# Patient Record
Sex: MALE | Race: WHITE | URBAN - NONMETROPOLITAN AREA
[De-identification: names, ages, dates, MRNs, and addresses within clinical notes are randomized per-mention and may not be internally consistent; named-entity substitution may affect disease eponyms.]

---

## 2019-05-31 ENCOUNTER — OFFICE VISIT (OUTPATIENT)
Dept: FAMILY MEDICINE CLINIC | Age: 39
End: 2019-05-31
Payer: COMMERCIAL

## 2019-05-31 VITALS
RESPIRATION RATE: 16 BRPM | HEIGHT: 70 IN | WEIGHT: 223 LBS | DIASTOLIC BLOOD PRESSURE: 76 MMHG | HEART RATE: 82 BPM | OXYGEN SATURATION: 98 % | SYSTOLIC BLOOD PRESSURE: 122 MMHG | BODY MASS INDEX: 31.92 KG/M2

## 2019-05-31 DIAGNOSIS — N52.9 ERECTILE DYSFUNCTION, UNSPECIFIED ERECTILE DYSFUNCTION TYPE: Primary | ICD-10-CM

## 2019-05-31 PROCEDURE — 99203 OFFICE O/P NEW LOW 30 MIN: CPT | Performed by: NURSE PRACTITIONER

## 2019-05-31 RX ORDER — ATORVASTATIN CALCIUM 20 MG/1
20 TABLET, FILM COATED ORAL DAILY
COMMUNITY
End: 2019-05-31

## 2019-05-31 RX ORDER — ATORVASTATIN CALCIUM 40 MG/1
1 TABLET, FILM COATED ORAL DAILY
COMMUNITY
Start: 2019-05-04 | End: 2019-12-18 | Stop reason: SDUPTHER

## 2019-05-31 RX ORDER — SILDENAFIL 50 MG/1
50 TABLET, FILM COATED ORAL DAILY PRN
Qty: 30 TABLET | Refills: 2 | Status: SHIPPED | OUTPATIENT
Start: 2019-05-31 | End: 2019-06-30

## 2019-05-31 ASSESSMENT — PATIENT HEALTH QUESTIONNAIRE - PHQ9
SUM OF ALL RESPONSES TO PHQ QUESTIONS 1-9: 0
2. FEELING DOWN, DEPRESSED OR HOPELESS: 0
1. LITTLE INTEREST OR PLEASURE IN DOING THINGS: 0
SUM OF ALL RESPONSES TO PHQ9 QUESTIONS 1 & 2: 0
SUM OF ALL RESPONSES TO PHQ QUESTIONS 1-9: 0

## 2019-05-31 ASSESSMENT — ENCOUNTER SYMPTOMS
NAUSEA: 0
EYE REDNESS: 0
SHORTNESS OF BREATH: 0
TROUBLE SWALLOWING: 0
ABDOMINAL PAIN: 0
DIARRHEA: 0
SORE THROAT: 0
EYE PAIN: 0
VOMITING: 0
COLOR CHANGE: 0
BACK PAIN: 0
CHEST TIGHTNESS: 0
COUGH: 0

## 2019-05-31 NOTE — PROGRESS NOTES
SUBJECTIVE:    Patient ID: Misbah Arora is a 41 y.o. male. Chief Complaint   Patient presents with    Discuss Medications       HPI     Patient's medications, allergies, past medical, surgical, social and family histories were reviewed and updated as appropriate. Current Outpatient Medications on File Prior to Visit   Medication Sig Dispense Refill    sertraline (ZOLOFT) 50 MG tablet Take 50 mg by mouth daily      Empagliflozin-metFORMIN HCl (SYNJARDY) 12.5-1000 MG TABS Take 1 tablet by mouth 2 times daily      atorvastatin (LIPITOR) 40 MG tablet Take 1 tablet by mouth daily       No current facility-administered medications on file prior to visit. Review of Systems   Constitutional: Negative for activity change, appetite change, chills and fever. HENT: Negative for congestion, ear pain, nosebleeds, sore throat and trouble swallowing. Eyes: Negative for pain and redness. Respiratory: Negative for cough, chest tightness and shortness of breath. Cardiovascular: Negative for chest pain, palpitations and leg swelling. Gastrointestinal: Negative for abdominal pain, diarrhea, nausea and vomiting. Genitourinary: Negative for difficulty urinating, dysuria and flank pain. Musculoskeletal: Negative for arthralgias, back pain and gait problem. Skin: Negative for color change, pallor, rash and wound. Allergic/Immunologic: Negative for environmental allergies and food allergies. Neurological: Negative for dizziness, numbness and headaches. Hematological: Negative for adenopathy. Does not bruise/bleed easily. Psychiatric/Behavioral: Negative for agitation and sleep disturbance. The patient is not nervous/anxious. OBJECTIVE:  /76   Pulse 82   Resp 16   Ht 5' 10\" (1.778 m)   Wt 223 lb (101.2 kg)   SpO2 98% Comment: room air  BMI 32.00 kg/m²       Physical Exam   Constitutional: He is oriented to person, place, and time.  Vital signs are normal. He appears well-developed and well-nourished. He is active. Non-toxic appearance. He does not have a sickly appearance. He does not appear ill. No distress. HENT:   Head: Normocephalic and atraumatic. Right Ear: Hearing, tympanic membrane, external ear and ear canal normal.   Left Ear: Hearing, tympanic membrane, external ear and ear canal normal.   Nose: Nose normal. No mucosal edema, rhinorrhea or nose lacerations. Right sinus exhibits no maxillary sinus tenderness and no frontal sinus tenderness. Left sinus exhibits no maxillary sinus tenderness and no frontal sinus tenderness. Mouth/Throat: Uvula is midline, oropharynx is clear and moist and mucous membranes are normal. Normal dentition. No dental caries. No tonsillar exudate. Eyes: Pupils are equal, round, and reactive to light. Conjunctivae, EOM and lids are normal. Right eye exhibits no discharge. Left eye exhibits no discharge. No scleral icterus. Neck: Trachea normal, normal range of motion, full passive range of motion without pain and phonation normal. Neck supple. Normal carotid pulses, no hepatojugular reflux and no JVD present. No tracheal tenderness present. Carotid bruit is not present. No tracheal deviation present. No thyroid mass and no thyromegaly present. Cardiovascular: Normal rate, regular rhythm, S1 normal, S2 normal, normal heart sounds, intact distal pulses and normal pulses. Exam reveals no gallop, no distant heart sounds and no friction rub. No murmur heard. Pulmonary/Chest: Effort normal and breath sounds normal. No accessory muscle usage or stridor. No apnea, no tachypnea and no bradypnea. He has no decreased breath sounds. He has no wheezes. He has no rhonchi. He has no rales. He exhibits no tenderness. Abdominal: Soft. Normal appearance and bowel sounds are normal. He exhibits no distension and no mass. There is no hepatosplenomegaly, splenomegaly or hepatomegaly. There is no tenderness. There is no rebound and no guarding.  No hernia. Musculoskeletal: Normal range of motion. He exhibits no edema, tenderness or deformity. Lymphadenopathy:     He has no cervical adenopathy. He has no axillary adenopathy. Neurological: He is alert and oriented to person, place, and time. He has normal strength. He is not disoriented. He displays normal reflexes. No cranial nerve deficit or sensory deficit. He exhibits normal muscle tone. Coordination normal. GCS eye subscore is 4. GCS verbal subscore is 5. GCS motor subscore is 6. Skin: Skin is warm, dry and intact. Capillary refill takes less than 2 seconds. No bruising and no rash noted. He is not diaphoretic. No erythema. No pallor. Psychiatric: He has a normal mood and affect. His speech is normal and behavior is normal. Judgment and thought content normal. Cognition and memory are normal.   Nursing note and vitals reviewed. No results found for requested labs within last 30 days. No results found for: LABA1C, LABMICR, LDLCALC      No results found for: WBC, NEUTROABS, HGB, HCT, MCV, PLT    No results found for: TSH      ASSESSMENT/PLAN:     There are no diagnoses linked to this encounter. PATIENT COUNSELING     Counseling was provided today regarding the following topics: Healthy eating habits, Regular exercise, substance abuse and healthy sleep habits. Discussed use, benefit, and side effects of prescribed medications. Barriers to medication compliance addressed. All patient questions answered. Patient voiced understanding. Medications Discontinued During This Encounter   Medication Reason    atorvastatin (LIPITOR) 20 MG tablet LIST CLEANUP       No follow-ups on file. KATY Field - CNP     Education was provided for discussed topics. Call the office with worsening complaints or any side effects to any medications. If an emergency please call 911.     Brett Ellison, MSN, APRN, FNP-BC, NP-C

## 2019-06-04 ASSESSMENT — ENCOUNTER SYMPTOMS
VOMITING: 0
TROUBLE SWALLOWING: 0
NAUSEA: 0
SORE THROAT: 0
COLOR CHANGE: 0
DIARRHEA: 0
CHEST TIGHTNESS: 0
EYE PAIN: 0
ABDOMINAL PAIN: 0
SHORTNESS OF BREATH: 0
BACK PAIN: 0
COUGH: 0
EYE REDNESS: 0

## 2019-06-04 NOTE — PROGRESS NOTES
SUBJECTIVE:    Patient ID: aMrquis Baugh is a 41 y.o. male. Chief Complaint   Patient presents with   Abbi Mary is a 41 y.o. male here for evaluation and treatment of erectile dysfunction. Partial erections occur with intercourse. Libido is affected. Risk factors for ED include beta blocker use and diabetes mellitus. Patient denies history of cardiovascular disease: , cranial, spinal, or pelvic trauma, hypogonadism, neurologic disease, pelvic radiation and urologic disease. Patient's expectations of sexual function would like to perform as age appropriate. Patient describes relationship with partner as good, could be better. Previous treatment of ED includes Viagra. Erectile Dysfunction   This is a recurrent problem. The current episode started more than 1 year ago. The problem has been waxing and waning since onset. The nature of his difficulty is achieving erection and maintaining erection. Non-physiologic factors contributing to erectile dysfunction are anxiety. He reports his erection duration to be less than 1 minute. Irritative symptoms do not include frequency, nocturia or urgency. Obstructive symptoms do not include dribbling, incomplete emptying, an intermittent stream, a slower stream, straining or a weak stream. Pertinent negatives include no chills, dysuria, genital pain, hematuria, hesitancy or inability to urinate. The symptoms are aggravated by medications and stress. Past treatments include sildenafil. The treatment provided significant relief. He has been using treatment for 1 to 7 days. He has had no adverse reactions caused by medications. Risk factors include tobacco use, diabetes mellitus and hypertension.         Active Ambulatory Problems     Diagnosis Date Noted    No Active Ambulatory Problems     Resolved Ambulatory Problems     Diagnosis Date Noted    No Resolved Ambulatory Problems     Past Medical History:   Diagnosis Date    Depression     Diabetes mellitus ([facility] [address]       No Known Allergies   Past Surgical History:   Procedure Laterality Date    ELBOW SURGERY Left       Family History   Problem Relation Age of Onset    Stroke Father     Cancer Maternal Grandfather     Cancer Paternal Grandfather      Patient's medications, allergies, past medical, surgical, social and family histories were reviewed and updated as appropriate. Current Outpatient Medications on File Prior to Visit   Medication Sig Dispense Refill    sertraline (ZOLOFT) 50 MG tablet Take 50 mg by mouth daily      Empagliflozin-metFORMIN HCl (SYNJARDY) 12.5-1000 MG TABS Take 1 tablet by mouth 2 times daily      atorvastatin (LIPITOR) 40 MG tablet Take 1 tablet by mouth daily       No current facility-administered medications on file prior to visit. Review of Systems   Constitutional: Negative for activity change, appetite change, chills and fever. HENT: Negative for congestion, ear pain, nosebleeds, sore throat and trouble swallowing. Eyes: Negative for pain and redness. Respiratory: Negative for cough, chest tightness and shortness of breath. Cardiovascular: Negative for chest pain, palpitations and leg swelling. Gastrointestinal: Negative for abdominal pain, diarrhea, nausea and vomiting. Genitourinary: Negative for difficulty urinating, dysuria, flank pain, frequency, hematuria, hesitancy, incomplete emptying, nocturia and urgency. Erectile disfunction. Musculoskeletal: Negative for arthralgias, back pain and gait problem. Skin: Negative for color change, pallor, rash and wound. Allergic/Immunologic: Negative for environmental allergies and food allergies. Neurological: Negative for dizziness, numbness and headaches. Hematological: Negative for adenopathy. Does not bruise/bleed easily. Psychiatric/Behavioral: Negative for agitation and sleep disturbance. The patient is nervous/anxious.       OBJECTIVE:  /76   Pulse 82   Resp 16   Ht 5' 10\" (1.778 m)   Wt 223 lb (101.2 kg)   SpO2 98% Comment: room air  BMI 32.00 kg/m²       Physical Exam   Constitutional: He is oriented to person, place, and time. Vital signs are normal. He appears well-developed and well-nourished. He is active. Non-toxic appearance. He does not have a sickly appearance. He does not appear ill. No distress. HENT:   Head: Normocephalic and atraumatic. Right Ear: Hearing, tympanic membrane, external ear and ear canal normal.   Left Ear: Hearing, tympanic membrane, external ear and ear canal normal.   Nose: Nose normal. No mucosal edema, rhinorrhea or nose lacerations. Right sinus exhibits no maxillary sinus tenderness and no frontal sinus tenderness. Left sinus exhibits no maxillary sinus tenderness and no frontal sinus tenderness. Mouth/Throat: Uvula is midline, oropharynx is clear and moist and mucous membranes are normal. Normal dentition. No dental caries. No oropharyngeal exudate. No tonsillar exudate. Eyes: Pupils are equal, round, and reactive to light. Conjunctivae, EOM and lids are normal. Right eye exhibits no discharge. Left eye exhibits no discharge. No scleral icterus. Neck: Trachea normal, normal range of motion, full passive range of motion without pain and phonation normal. Neck supple. Normal carotid pulses, no hepatojugular reflux and no JVD present. No tracheal tenderness present. Carotid bruit is not present. No tracheal deviation present. No thyroid mass and no thyromegaly present. Cardiovascular: Normal rate, regular rhythm, S1 normal, S2 normal, normal heart sounds, intact distal pulses and normal pulses. Exam reveals no gallop, no distant heart sounds and no friction rub. No murmur heard. Pulmonary/Chest: Effort normal and breath sounds normal. No accessory muscle usage or stridor. No apnea, no tachypnea and no bradypnea. No respiratory distress. He has no decreased breath sounds. He has no wheezes. He has no rhonchi. He has no rales.  He exhibits no tenderness. Abdominal: Soft. Normal appearance and bowel sounds are normal. He exhibits no distension and no mass. There is no hepatosplenomegaly, splenomegaly or hepatomegaly. There is no tenderness. There is no rebound and no guarding. No hernia. Musculoskeletal: Normal range of motion. He exhibits no edema, tenderness or deformity. Lymphadenopathy:     He has no cervical adenopathy. He has no axillary adenopathy. Neurological: He is alert and oriented to person, place, and time. He has normal strength. He is not disoriented. He displays normal reflexes. No cranial nerve deficit or sensory deficit. He exhibits normal muscle tone. Coordination normal. GCS eye subscore is 4. GCS verbal subscore is 5. GCS motor subscore is 6. Skin: Skin is warm, dry and intact. Capillary refill takes less than 2 seconds. No bruising and no rash noted. He is not diaphoretic. No erythema. No pallor. Psychiatric: He has a normal mood and affect. His speech is normal and behavior is normal. Judgment and thought content normal. Cognition and memory are normal.   Nursing note and vitals reviewed. No results found for requested labs within last 30 days. ASSESSMENT/PLAN:     Sarah Aponte was seen today for discuss medications. Diagnoses and all orders for this visit:    Erectile dysfunction, unspecified erectile dysfunction type  -     sildenafil (VIAGRA) 50 MG tablet; Take 1 tablet by mouth daily as needed for  Erectile Dysfunction  - Reviewed pathophysiology and differential diagnosis of erectile dysfunction with  the patient. Treatment options, including relative risks and benefits, were  discussed. All questions were answered. Discontinue potentially causitive medications. Trial of Viagra; no contraindications. - Discussed further work up with routine lab testing. Patient has another PCP and recently had labs drawn. I have reviewed these results today and there were no abnormal findings. - Discussed other options to replace the SSRI that patient is currently on to aid with sexual performance. Patient denies wanting to changes medications at this time. PATIENT COUNSELING     Counseling was provided today regarding the following topics: Healthy eating habits, Regular exercise, substance abuse and healthy sleep habits. Discussed use, benefit, and side effects of prescribed medications. Barriers to medication compliance addressed. All patient questions answered. Patient voiced understanding. Medications Discontinued During This Encounter   Medication Reason    atorvastatin (LIPITOR) 20 MG tablet LIST CLEANUP       Return if symptoms worsen or fail to improve. KATY Roth - CNP     Education was provided for discussed topics. Call the office with worsening complaints or any side effects to any medications. If an emergency please call 911.

## 2019-10-17 DIAGNOSIS — M54.50 ACUTE LEFT-SIDED LOW BACK PAIN WITHOUT SCIATICA: Primary | ICD-10-CM

## 2019-10-18 DIAGNOSIS — M54.50 ACUTE LEFT-SIDED LOW BACK PAIN WITHOUT SCIATICA: ICD-10-CM

## 2019-12-18 RX ORDER — ATORVASTATIN CALCIUM 40 MG/1
40 TABLET, FILM COATED ORAL DAILY
Qty: 30 TABLET | Refills: 3 | Status: SHIPPED | OUTPATIENT
Start: 2019-12-18 | End: 2020-06-08 | Stop reason: SDUPTHER

## 2019-12-18 RX ORDER — GLUCOSAMINE HCL/CHONDROITIN SU 500-400 MG
CAPSULE ORAL
Qty: 100 STRIP | Refills: 2 | Status: SHIPPED | OUTPATIENT
Start: 2019-12-18

## 2019-12-19 RX ORDER — LANCETS 30 GAUGE
1 EACH MISCELLANEOUS 2 TIMES DAILY
Qty: 100 EACH | Refills: 5 | Status: SHIPPED | OUTPATIENT
Start: 2019-12-19

## 2020-04-01 RX ORDER — EMPAGLIFLOZIN AND METFORMIN HYDROCHLORIDE 12.5; 1 MG/1; MG/1
TABLET ORAL
Qty: 60 TABLET | Refills: 0 | Status: SHIPPED | OUTPATIENT
Start: 2020-04-01 | End: 2020-05-18

## 2020-04-20 RX ORDER — ATORVASTATIN CALCIUM 40 MG/1
TABLET, FILM COATED ORAL
Qty: 30 TABLET | Refills: 2 | OUTPATIENT
Start: 2020-04-20

## 2020-05-07 RX ORDER — ATOMOXETINE 40 MG/1
40 CAPSULE ORAL DAILY
Qty: 30 CAPSULE | Refills: 3 | Status: SHIPPED | OUTPATIENT
Start: 2020-05-07 | End: 2020-06-08 | Stop reason: SDUPTHER

## 2020-05-18 RX ORDER — EMPAGLIFLOZIN AND METFORMIN HYDROCHLORIDE 12.5; 1 MG/1; MG/1
TABLET ORAL
Qty: 60 TABLET | Refills: 0 | Status: SHIPPED | OUTPATIENT
Start: 2020-05-18 | End: 2020-06-08 | Stop reason: SDUPTHER

## 2020-06-05 ENCOUNTER — HOSPITAL ENCOUNTER (OUTPATIENT)
Facility: HOSPITAL | Age: 40
Discharge: HOME | End: 2020-06-05
Payer: COMMERCIAL

## 2020-06-05 LAB
A/G RATIO: 1.9 (ref 0.8–2)
ALBUMIN SERPL-MCNC: 4.9 G/DL (ref 3.4–4.8)
ALP BLD-CCNC: 81 U/L (ref 25–100)
ALT SERPL-CCNC: 41 U/L (ref 4–36)
ANION GAP SERPL CALCULATED.3IONS-SCNC: 16 MMOL/L (ref 3–16)
AST SERPL-CCNC: 20 U/L (ref 8–33)
BASOPHILS ABSOLUTE: 0.1 K/UL (ref 0–0.1)
BASOPHILS RELATIVE PERCENT: 0.6 %
BILIRUB SERPL-MCNC: 0.5 MG/DL (ref 0.3–1.2)
BUN BLDV-MCNC: 17 MG/DL (ref 6–20)
CALCIUM SERPL-MCNC: 9.5 MG/DL (ref 8.5–10.5)
CHLORIDE BLD-SCNC: 100 MMOL/L (ref 98–107)
CHOLESTEROL, TOTAL: 161 MG/DL (ref 0–200)
CO2: 23 MMOL/L (ref 20–30)
CREAT SERPL-MCNC: 0.8 MG/DL (ref 0.4–1.2)
EOSINOPHILS ABSOLUTE: 0.2 K/UL (ref 0–0.4)
EOSINOPHILS RELATIVE PERCENT: 1.9 %
GFR AFRICAN AMERICAN: >59
GFR NON-AFRICAN AMERICAN: >59
GLOBULIN: 2.6 G/DL
GLUCOSE BLD-MCNC: 205 MG/DL (ref 74–106)
HBA1C MFR BLD: 8.5 %
HCT VFR BLD CALC: 48.5 % (ref 40–54)
HDLC SERPL-MCNC: 39 MG/DL (ref 40–60)
HEMOGLOBIN: 15.4 G/DL (ref 13–18)
IMMATURE GRANULOCYTES #: 0.1 K/UL
IMMATURE GRANULOCYTES %: 0.8 % (ref 0–5)
LDL CHOLESTEROL CALCULATED: 77 MG/DL
LYMPHOCYTES ABSOLUTE: 2.6 K/UL (ref 1.5–4)
LYMPHOCYTES RELATIVE PERCENT: 29.2 %
MCH RBC QN AUTO: 26.8 PG (ref 27–32)
MCHC RBC AUTO-ENTMCNC: 31.8 G/DL (ref 31–35)
MCV RBC AUTO: 84.3 FL (ref 80–100)
MONOCYTES ABSOLUTE: 0.7 K/UL (ref 0.2–0.8)
MONOCYTES RELATIVE PERCENT: 7.6 %
NEUTROPHILS ABSOLUTE: 5.2 K/UL (ref 2–7.5)
NEUTROPHILS RELATIVE PERCENT: 59.9 %
PDW BLD-RTO: 13.5 % (ref 11–16)
PLATELET # BLD: 276 K/UL (ref 150–400)
PMV BLD AUTO: 10.4 FL (ref 6–10)
POTASSIUM SERPL-SCNC: 4.7 MMOL/L (ref 3.4–5.1)
RBC # BLD: 5.75 M/UL (ref 4.5–6)
SODIUM BLD-SCNC: 139 MMOL/L (ref 136–145)
TOTAL PROTEIN: 7.5 G/DL (ref 6.4–8.3)
TRIGL SERPL-MCNC: 226 MG/DL (ref 0–249)
TSH REFLEX: 2.02 UIU/ML (ref 0.35–5.5)
VLDLC SERPL CALC-MCNC: 45 MG/DL
WBC # BLD: 8.7 K/UL (ref 4–11)

## 2020-06-05 PROCEDURE — 80061 LIPID PANEL: CPT

## 2020-06-05 PROCEDURE — 85025 COMPLETE CBC W/AUTO DIFF WBC: CPT

## 2020-06-05 PROCEDURE — 36415 COLL VENOUS BLD VENIPUNCTURE: CPT

## 2020-06-05 PROCEDURE — 84443 ASSAY THYROID STIM HORMONE: CPT

## 2020-06-05 PROCEDURE — 83036 HEMOGLOBIN GLYCOSYLATED A1C: CPT

## 2020-06-05 PROCEDURE — 80053 COMPREHEN METABOLIC PANEL: CPT

## 2020-06-08 ENCOUNTER — VIRTUAL VISIT (OUTPATIENT)
Dept: FAMILY MEDICINE CLINIC | Age: 40
End: 2020-06-08
Payer: COMMERCIAL

## 2020-06-08 PROCEDURE — 99442 PR PHYS/QHP TELEPHONE EVALUATION 11-20 MIN: CPT | Performed by: NURSE PRACTITIONER

## 2020-06-08 RX ORDER — ATOMOXETINE 40 MG/1
40 CAPSULE ORAL DAILY
Qty: 30 CAPSULE | Refills: 3 | Status: SHIPPED | OUTPATIENT
Start: 2020-06-08

## 2020-06-08 RX ORDER — EMPAGLIFLOZIN AND METFORMIN HYDROCHLORIDE 12.5; 1 MG/1; MG/1
TABLET ORAL
Qty: 60 TABLET | Refills: 2 | Status: SHIPPED | OUTPATIENT
Start: 2020-06-08

## 2020-06-08 RX ORDER — ATORVASTATIN CALCIUM 40 MG/1
40 TABLET, FILM COATED ORAL DAILY
Qty: 30 TABLET | Refills: 3 | Status: SHIPPED | OUTPATIENT
Start: 2020-06-08

## 2020-06-08 ASSESSMENT — ENCOUNTER SYMPTOMS
NAUSEA: 0
TROUBLE SWALLOWING: 0
DIARRHEA: 0
EYE REDNESS: 0
COLOR CHANGE: 0
EYE PAIN: 0
BACK PAIN: 0
CHEST TIGHTNESS: 0
ABDOMINAL PAIN: 0
SORE THROAT: 0
SHORTNESS OF BREATH: 0
COUGH: 0
VOMITING: 0

## 2020-06-15 RX ORDER — EMPAGLIFLOZIN AND METFORMIN HYDROCHLORIDE 12.5; 1 MG/1; MG/1
TABLET ORAL
Qty: 60 TABLET | Refills: 0 | OUTPATIENT
Start: 2020-06-15

## 2020-06-17 PROCEDURE — G0444 DEPRESSION SCREEN ANNUAL: HCPCS | Performed by: NURSE PRACTITIONER

## 2020-06-17 ASSESSMENT — PATIENT HEALTH QUESTIONNAIRE - PHQ9
SUM OF ALL RESPONSES TO PHQ9 QUESTIONS 1 & 2: 0
SUM OF ALL RESPONSES TO PHQ QUESTIONS 1-9: 0
2. FEELING DOWN, DEPRESSED OR HOPELESS: 0
1. LITTLE INTEREST OR PLEASURE IN DOING THINGS: 0
SUM OF ALL RESPONSES TO PHQ QUESTIONS 1-9: 0

## 2020-08-03 ENCOUNTER — TELEPHONE (OUTPATIENT)
Dept: FAMILY MEDICINE CLINIC | Age: 40
End: 2020-08-03

## 2020-08-03 RX ORDER — ATOMOXETINE 60 MG/1
60 CAPSULE ORAL DAILY
Qty: 30 CAPSULE | Refills: 3 | Status: SHIPPED | OUTPATIENT
Start: 2020-08-03

## 2020-08-20 RX ORDER — MELOXICAM 15 MG/1
15 TABLET ORAL DAILY
COMMUNITY
End: 2020-09-10 | Stop reason: SDUPTHER

## 2020-08-20 RX ORDER — ATOMOXETINE 60 MG/1
60 CAPSULE ORAL DAILY
COMMUNITY
End: 2020-09-10 | Stop reason: SDUPTHER

## 2020-08-20 RX ORDER — ATORVASTATIN CALCIUM 40 MG/1
40 TABLET, FILM COATED ORAL DAILY
COMMUNITY
End: 2020-09-10 | Stop reason: SDUPTHER

## 2020-08-20 RX ORDER — SILDENAFIL 50 MG/1
50 TABLET, FILM COATED ORAL AS NEEDED
COMMUNITY
End: 2021-02-26 | Stop reason: SDUPTHER

## 2020-08-24 PROBLEM — E78.5 HYPERLIPIDEMIA: Status: ACTIVE | Noted: 2020-08-24

## 2020-08-24 PROBLEM — R53.83 FATIGUE: Status: ACTIVE | Noted: 2020-08-24

## 2020-08-24 PROBLEM — E11.9 TYPE 2 DIABETES MELLITUS WITHOUT COMPLICATION (HCC): Status: ACTIVE | Noted: 2020-08-24

## 2020-09-10 ENCOUNTER — OFFICE VISIT (OUTPATIENT)
Dept: INTERNAL MEDICINE | Facility: CLINIC | Age: 40
End: 2020-09-10

## 2020-09-10 DIAGNOSIS — E11.9 TYPE 2 DIABETES MELLITUS WITHOUT COMPLICATION, WITHOUT LONG-TERM CURRENT USE OF INSULIN (HCC): Primary | ICD-10-CM

## 2020-09-10 DIAGNOSIS — K92.1 BLOODY STOOLS: ICD-10-CM

## 2020-09-10 DIAGNOSIS — M25.512 CHRONIC LEFT SHOULDER PAIN: ICD-10-CM

## 2020-09-10 DIAGNOSIS — F98.8 ATTENTION DEFICIT DISORDER (ADD) WITHOUT HYPERACTIVITY: ICD-10-CM

## 2020-09-10 DIAGNOSIS — Z13.0 SCREENING FOR BLOOD DISEASE: ICD-10-CM

## 2020-09-10 DIAGNOSIS — E78.5 HYPERLIPIDEMIA, UNSPECIFIED HYPERLIPIDEMIA TYPE: ICD-10-CM

## 2020-09-10 DIAGNOSIS — Z13.21 ENCOUNTER FOR VITAMIN DEFICIENCY SCREENING: ICD-10-CM

## 2020-09-10 DIAGNOSIS — M54.50 LUMBAR PAIN: ICD-10-CM

## 2020-09-10 DIAGNOSIS — Z13.29 THYROID DISORDER SCREENING: ICD-10-CM

## 2020-09-10 DIAGNOSIS — G89.29 CHRONIC LEFT SHOULDER PAIN: ICD-10-CM

## 2020-09-10 PROCEDURE — 99443 PR PHYS/QHP TELEPHONE EVALUATION 21-30 MIN: CPT | Performed by: NURSE PRACTITIONER

## 2020-09-10 RX ORDER — MELOXICAM 15 MG/1
15 TABLET ORAL DAILY
Qty: 30 TABLET | Refills: 2 | Status: SHIPPED | OUTPATIENT
Start: 2020-09-10 | End: 2020-12-04

## 2020-09-10 RX ORDER — LANCETS 33 GAUGE
EACH MISCELLANEOUS
COMMUNITY
Start: 2020-06-15

## 2020-09-10 RX ORDER — ATOMOXETINE 60 MG/1
60 CAPSULE ORAL DAILY
Qty: 30 CAPSULE | Refills: 2 | Status: SHIPPED | OUTPATIENT
Start: 2020-09-10 | End: 2021-02-10 | Stop reason: SDUPTHER

## 2020-09-10 RX ORDER — ATORVASTATIN CALCIUM 40 MG/1
40 TABLET, FILM COATED ORAL DAILY
Qty: 30 TABLET | Refills: 2 | Status: SHIPPED | OUTPATIENT
Start: 2020-09-10 | End: 2021-02-10 | Stop reason: SDUPTHER

## 2020-09-10 RX ORDER — BLOOD SUGAR DIAGNOSTIC
STRIP MISCELLANEOUS
COMMUNITY
Start: 2020-06-15 | End: 2022-06-02 | Stop reason: SDUPTHER

## 2020-09-10 RX ORDER — BLOOD-GLUCOSE METER
EACH MISCELLANEOUS
COMMUNITY
Start: 2020-06-15

## 2020-09-10 NOTE — PROGRESS NOTES
Subjective   Chief Complaint   Patient presents with   • Diabetes   • Hyperlipidemia     This is Telephone visit.  You have chosen to receive care through a telephone visit today. Do you consent to use a telephone visit for your medical care today? Yes    Sunil Aparicio is a 39 y.o. male here today to establish care for diabetes, hyperlipidemia, ADD, joint pain, and back pain.  Patient was diagnosed with diabetes about 1 year ago and has been taking some oral medication for this.  He occasionally checks blood sugars that average around 120.  Since being diagnosed he has been more physically active and has lost weight.  He was also diagnosed with elevated cholesterol and started on Lipitor.  He is following a low-cholesterol diabetic diet.  Blood pressures are stable and at goal.  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  No shortness of breath or chest pain.  Patient has chronic left shoulder pain and lumbar pain.  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  He had x-ray of lumbar spine years ago that was normal.  He is never had shoulder x-rayed.  He takes meloxicam that greatly helps with symptoms.  About 4 years ago he began experiencing some depression, anxiety, and difficulty concentrating.  He was started on Zoloft that made his symptoms worse.  He was then started on Strattera for ADD and concentration greatly improved.  His depression and anxiety improved after this and the medication has made a huge difference.  He has been experiencing some intermittent bloody stools.  Blood is bright red.  He denies any rectal pain or history of hemorrhoids.  Bowel movements have been regular and brown in color.  No constipation.  No family history of colon cancer.  No prior colonoscopy.     Review of Systems   Constitutional: Negative for activity change, appetite change, chills, diaphoresis, fatigue, fever, unexpected weight gain and unexpected weight loss.    HENT: Negative for ear discharge, ear pain, mouth sores, nosebleeds, sinus pressure, sneezing and sore throat.    Eyes: Negative for pain, discharge and itching.   Respiratory: Negative for cough, chest tightness, shortness of breath and wheezing.    Cardiovascular: Negative for chest pain, palpitations and leg swelling.   Gastrointestinal: Positive for blood in stool. Negative for abdominal pain, constipation, diarrhea, nausea and vomiting.   Endocrine: Negative for heat intolerance, polydipsia and polyphagia.   Genitourinary: Negative for dysuria, flank pain, frequency, hematuria and urgency.   Musculoskeletal: Positive for arthralgias, back pain and myalgias. Negative for gait problem, joint swelling, neck pain and neck stiffness.   Skin: Negative for color change, pallor and rash.   Allergic/Immunologic: Negative for immunocompromised state.   Neurological: Negative for seizures, speech difficulty, weakness and numbness.   Hematological: Negative for adenopathy.   Psychiatric/Behavioral: Negative for agitation, decreased concentration, sleep disturbance, suicidal ideas and depressed mood. The patient is not nervous/anxious.        Past Medical History:   Diagnosis Date   • ADD (attention deficit disorder)    • Diabetes (CMS/HCC)    • Hyperlipidemia      Past Surgical History:   Procedure Laterality Date   • ELBOW FUSION Left      Family History   Problem Relation Age of Onset   • Stroke Father      Social History     Tobacco Use   Smoking Status Never Smoker   Smokeless Tobacco Never Used      Social History     Substance and Sexual Activity   Alcohol Use Not on file      Current Outpatient Medications on File Prior to Visit   Medication Sig   • sildenafil (VIAGRA) 50 MG tablet Take 50 mg by mouth As Needed.   • [DISCONTINUED] atomoxetine (STRATTERA) 60 MG capsule Take 60 mg by mouth Daily.   • [DISCONTINUED] atorvastatin (LIPITOR) 40 MG tablet Take 40 mg by mouth Daily.   • [DISCONTINUED]  Empagliflozin-metFORMIN HCl ER 12.5-1000 MG tablet sustained-release 24 hour Take 1 tablet by mouth 2 (two) times a day.   • [DISCONTINUED] meloxicam (MOBIC) 15 MG tablet Take 15 mg by mouth Daily.   • Blood Glucose Monitoring Suppl (ONE TOUCH ULTRA 2) w/Device kit    • Lancets (ONETOUCH DELICA PLUS [id number]) misc    • ONETOUCH ULTRA test strip      No current facility-administered medications on file prior to visit.      No Known Allergies    Objective   There were no vitals filed for this visit.  There is no height or weight on file to calculate BMI.    Physical Exam   Constitutional: He is oriented to person, place, and time.   Pulmonary/Chest: No respiratory distress.   Neurological: He is alert and oriented to person, place, and time.   Psychiatric: He has a normal mood and affect. His speech is normal. Thought content normal. Cognition and memory are normal.       Assessment/Plan   Problem List Items Addressed This Visit        Cardiovascular and Mediastinum    Hyperlipidemia    Overview     Chronic (stability determined by lab results) requiring medication management, lifestyle changes, and monitoring. Discussed how this being unstable increases risk of cardiovascular disease and adverse events. Will follow a hearth healthy diet low in cholesterol and fat. Discussed increasing fiber intake. Suggested fish oil or omega 3 supplement of 1200mg twice daily. Educated on how these help lower triglycerides and LDL. Will drink adequate water and increase physical activity as tolerated. Discussed importance of medication compliance.   Continue Lipitor.            Endocrine    Type 2 diabetes mellitus without complication, without long-term current use of insulin (CMS/[facility]) - Primary    Overview     Chronic issue (stability determined by lab results) requiring medication management and monitoring. Will eat well balanced heart healthy diabetic diet, drink adequate water, increase physical activity, and get adequate  rest. Will monitor blood sugars daily and keep log for next appt. Will contact office earlier if blood sugars are averaging above 250.   Continue Empagliflozin and metformin.               Nervous and Auditory    Chronic left shoulder pain    Overview     Chronic issue unstable requiring further workup, medication management, and monitoring. Will eat well balanced diet, increase water intake, increase physical activity as tolerated, and get adequate rest. Can use warmth or ice for discomfort in this area. Discussed stretching exercises.   Continue meloxicam.          Relevant Orders    XR Shoulder 2+ View Left    Lumbar pain    Overview     Chronic issue unstable requiring further workup, medication management, and monitoring. Will eat well balanced diet, increase water intake, increase physical activity as tolerated, and get adequate rest. Can use warmth or ice for discomfort in this area. Discussed stretching exercises.   Continue meloxicam.          Relevant Orders    XR Spine Lumbar Complete 4+VW       Other    Bloody stools    Overview     Recurrent issue requiring further workup, referral to specialty, and monitoring.          Relevant Orders    Ambulatory Referral For Screening Colonoscopy    CBC (No Diff)    Attention deficit disorder (ADD) without hyperactivity    Overview     Chronic issue stable requiring medication management and monitoring. Will eat well balanced diet, increase water intake, increase physical activity during the day, and get adequate rest. Discussed relaxation and coping skills and exercises.   Continue Strattera           Other Visit Diagnoses     Screening for blood disease        Relevant Orders    CBC (No Diff)    Comprehensive Metabolic Panel    Lipid Panel    TSH Rfx On Abnormal To Free T4    Hemoglobin A1c    Vitamin B12 & Folate    Vitamin D 25 Hydroxy    Thyroid disorder screening        Relevant Orders    TSH Rfx On Abnormal To Free T4    Encounter for vitamin deficiency  screening        Relevant Orders    Vitamin B12 & Folate    Vitamin D 25 Hydroxy             Current Outpatient Medications:   •  sildenafil (VIAGRA) 50 MG tablet, Take 50 mg by mouth As Needed., Disp: , Rfl:   •  atomoxetine (STRATTERA) 60 MG capsule, Take 1 capsule by mouth Daily., Disp: 30 capsule, Rfl: 2  •  atorvastatin (LIPITOR) 40 MG tablet, Take 1 tablet by mouth Daily., Disp: 30 tablet, Rfl: 2  •  Blood Glucose Monitoring Suppl (ONE TOUCH ULTRA 2) w/Device kit, , Disp: , Rfl:   •  Empagliflozin-metFORMIN HCl ER 12.5-1000 MG tablet sustained-release 24 hour, Take 1 tablet by mouth 2 (two) times a day., Disp: 60 tablet, Rfl: 2  •  Lancets (ONETOUCH DELICA PLUS [id number]) misc, , Disp: , Rfl:   •  meloxicam (MOBIC) 15 MG tablet, Take 1 tablet by mouth Daily., Disp: 30 tablet, Rfl: 2  •  ONETOUCH ULTRA test strip, , Disp: , Rfl:        Plan of care reviewed with the patient at the conclusion of today's visit.  Education was provided regarding diagnosis, management, and any prescribed or recommended OTC medications.  Patient verbalized understanding of and agreement with management plan.     Return in about 3 months (around 12/10/2020), or if symptoms worsen or fail to improve, for Follow-up.     I spent 40 minutes in medical discussion with patient during this visit.     Lilibeth Lopez, MIRANDA    Please note that portions of this note were completed with a voice recognition program. Efforts were made to edit the dictations, but occasionally words are mistranscribed.

## 2020-09-28 ENCOUNTER — LAB (OUTPATIENT)
Dept: LAB | Facility: HOSPITAL | Age: 40
End: 2020-09-28

## 2020-09-28 DIAGNOSIS — K92.1 BLOODY STOOLS: ICD-10-CM

## 2020-09-28 DIAGNOSIS — Z13.21 ENCOUNTER FOR VITAMIN DEFICIENCY SCREENING: ICD-10-CM

## 2020-09-28 DIAGNOSIS — Z13.29 THYROID DISORDER SCREENING: ICD-10-CM

## 2020-09-28 DIAGNOSIS — Z13.0 SCREENING FOR BLOOD DISEASE: ICD-10-CM

## 2020-09-28 LAB
25(OH)D3 SERPL-MCNC: 45.1 NG/ML (ref 30–100)
ALBUMIN SERPL-MCNC: 4.4 G/DL (ref 3.5–5.2)
ALBUMIN/GLOB SERPL: 1.9 G/DL
ALP SERPL-CCNC: 77 U/L (ref 39–117)
ALT SERPL W P-5'-P-CCNC: 42 U/L (ref 1–41)
ANION GAP SERPL CALCULATED.3IONS-SCNC: 12.5 MMOL/L (ref 5–15)
AST SERPL-CCNC: 18 U/L (ref 1–40)
BILIRUB SERPL-MCNC: 0.8 MG/DL (ref 0–1.2)
BUN SERPL-MCNC: 8 MG/DL (ref 6–20)
BUN/CREAT SERPL: 10.3 (ref 7–25)
CALCIUM SPEC-SCNC: 9.5 MG/DL (ref 8.6–10.5)
CHLORIDE SERPL-SCNC: 104 MMOL/L (ref 98–107)
CHOLEST SERPL-MCNC: 128 MG/DL (ref 0–200)
CO2 SERPL-SCNC: 23.5 MMOL/L (ref 22–29)
CREAT SERPL-MCNC: 0.78 MG/DL (ref 0.76–1.27)
DEPRECATED RDW RBC AUTO: 40.9 FL (ref 37–54)
ERYTHROCYTE [DISTWIDTH] IN BLOOD BY AUTOMATED COUNT: 13.2 % (ref 12.3–15.4)
FOLATE SERPL-MCNC: 13.7 NG/ML (ref 4.78–24.2)
GFR SERPL CREATININE-BSD FRML MDRD: 111 ML/MIN/1.73
GLOBULIN UR ELPH-MCNC: 2.3 GM/DL
GLUCOSE SERPL-MCNC: 111 MG/DL (ref 65–99)
HBA1C MFR BLD: 7.3 % (ref 4.8–5.6)
HCT VFR BLD AUTO: 43.6 % (ref 37.5–51)
HDLC SERPL-MCNC: 41 MG/DL (ref 40–60)
HGB BLD-MCNC: 14.5 G/DL (ref 13–17.7)
LDLC SERPL CALC-MCNC: 72 MG/DL (ref 0–100)
LDLC/HDLC SERPL: 1.77 {RATIO}
MCH RBC QN AUTO: 28.1 PG (ref 26.6–33)
MCHC RBC AUTO-ENTMCNC: 33.3 G/DL (ref 31.5–35.7)
MCV RBC AUTO: 84.5 FL (ref 79–97)
PLATELET # BLD AUTO: 244 10*3/MM3 (ref 140–450)
PMV BLD AUTO: 11 FL (ref 6–12)
POTASSIUM SERPL-SCNC: 4.2 MMOL/L (ref 3.5–5.2)
PROT SERPL-MCNC: 6.7 G/DL (ref 6–8.5)
RBC # BLD AUTO: 5.16 10*6/MM3 (ref 4.14–5.8)
SODIUM SERPL-SCNC: 140 MMOL/L (ref 136–145)
TRIGL SERPL-MCNC: 73 MG/DL (ref 0–150)
TSH SERPL DL<=0.05 MIU/L-ACNC: 1.52 UIU/ML (ref 0.27–4.2)
VIT B12 BLD-MCNC: 519 PG/ML (ref 211–946)
VLDLC SERPL-MCNC: 14.6 MG/DL (ref 5–40)
WBC # BLD AUTO: 6.54 10*3/MM3 (ref 3.4–10.8)

## 2020-09-28 PROCEDURE — 82607 VITAMIN B-12: CPT | Performed by: NURSE PRACTITIONER

## 2020-09-28 PROCEDURE — 80053 COMPREHEN METABOLIC PANEL: CPT | Performed by: NURSE PRACTITIONER

## 2020-09-28 PROCEDURE — 84443 ASSAY THYROID STIM HORMONE: CPT | Performed by: NURSE PRACTITIONER

## 2020-09-28 PROCEDURE — 80061 LIPID PANEL: CPT | Performed by: NURSE PRACTITIONER

## 2020-09-28 PROCEDURE — 82746 ASSAY OF FOLIC ACID SERUM: CPT | Performed by: NURSE PRACTITIONER

## 2020-09-28 PROCEDURE — 83036 HEMOGLOBIN GLYCOSYLATED A1C: CPT | Performed by: NURSE PRACTITIONER

## 2020-09-28 PROCEDURE — 82306 VITAMIN D 25 HYDROXY: CPT | Performed by: NURSE PRACTITIONER

## 2020-09-28 PROCEDURE — 85027 COMPLETE CBC AUTOMATED: CPT | Performed by: NURSE PRACTITIONER

## 2020-10-22 RX ORDER — TRIAMCINOLONE ACETONIDE 1 MG/G
CREAM TOPICAL 2 TIMES DAILY PRN
Qty: 80 G | Refills: 1 | Status: SHIPPED | OUTPATIENT
Start: 2020-10-22 | End: 2021-04-30

## 2020-11-06 ENCOUNTER — HOSPITAL ENCOUNTER (OUTPATIENT)
Dept: GENERAL RADIOLOGY | Facility: HOSPITAL | Age: 40
Discharge: HOME | End: 2020-11-06
Admitting: NURSE PRACTITIONER

## 2020-11-06 DIAGNOSIS — G89.29 CHRONIC LEFT SHOULDER PAIN: ICD-10-CM

## 2020-11-06 DIAGNOSIS — G89.29 CHRONIC LEFT SHOULDER PAIN: Primary | ICD-10-CM

## 2020-11-06 DIAGNOSIS — M54.50 LUMBAR PAIN: ICD-10-CM

## 2020-11-06 DIAGNOSIS — M25.512 CHRONIC LEFT SHOULDER PAIN: Primary | ICD-10-CM

## 2020-11-06 DIAGNOSIS — M25.512 CHRONIC LEFT SHOULDER PAIN: ICD-10-CM

## 2020-11-06 PROCEDURE — 72110 X-RAY EXAM L-2 SPINE 4/>VWS: CPT

## 2020-11-06 PROCEDURE — 73030 X-RAY EXAM OF SHOULDER: CPT

## 2020-11-06 NOTE — PROGRESS NOTES
My chart message:    Shoulder xray shows some mild arthritis. Do you want to try physical therapy? If pain does not improve you will need an MRI to rule out rotator cuff injury.

## 2020-11-06 NOTE — PROGRESS NOTES
My chart message:    You have moderate multi-level arthritis in your lower back and this is causing you pain. Continue with the meloxicam. I can send you a muscle relaxer if you start having spasms. I suggest PT on your back also if you have time.

## 2020-11-12 ENCOUNTER — TELEPHONE (OUTPATIENT)
Dept: GASTROENTEROLOGY | Facility: CLINIC | Age: 40
End: 2020-11-12

## 2020-11-12 NOTE — TELEPHONE ENCOUNTER
DR. MCDONNELL OFFICE CALLED IN RE: TO PT REFERRAL AND YET TO BE SCHEDULED. ADVISED OF DR. MURRIETA/FRANCO OFFICE NUMBER AND WAS TRANSFERRED TO SCHEDULE APPT FOR Tami ROHIT.

## 2020-11-24 RX ORDER — OMEPRAZOLE 20 MG/1
20 CAPSULE, DELAYED RELEASE ORAL DAILY
Qty: 90 CAPSULE | Refills: 3 | Status: SHIPPED | OUTPATIENT
Start: 2020-11-24 | End: 2021-02-10 | Stop reason: SDUPTHER

## 2020-12-04 RX ORDER — MELOXICAM 15 MG/1
TABLET ORAL
Qty: 30 TABLET | Refills: 5 | Status: SHIPPED | OUTPATIENT
Start: 2020-12-04 | End: 2021-06-03

## 2020-12-04 RX ORDER — EMPAGLIFLOZIN AND METFORMIN HYDROCHLORIDE 12.5; 1 MG/1; MG/1
TABLET ORAL
Qty: 60 TABLET | Refills: 5 | Status: SHIPPED | OUTPATIENT
Start: 2020-12-04 | End: 2021-06-03

## 2020-12-30 RX ORDER — SODIUM, POTASSIUM,MAG SULFATES 17.5-3.13G
2 SOLUTION, RECONSTITUTED, ORAL ORAL TAKE AS DIRECTED
Qty: 354 ML | Refills: 0 | Status: SHIPPED | OUTPATIENT
Start: 2020-12-30 | End: 2021-03-09

## 2021-01-05 ENCOUNTER — APPOINTMENT (OUTPATIENT)
Dept: PREADMISSION TESTING | Facility: HOSPITAL | Age: 41
End: 2021-01-05

## 2021-01-05 PROCEDURE — U0004 COV-19 TEST NON-CDC HGH THRU: HCPCS

## 2021-01-05 PROCEDURE — C9803 HOPD COVID-19 SPEC COLLECT: HCPCS

## 2021-01-06 LAB — SARS-COV-2 RNA RESP QL NAA+PROBE: NOT DETECTED

## 2021-01-08 ENCOUNTER — OUTSIDE FACILITY SERVICE (OUTPATIENT)
Dept: GASTROENTEROLOGY | Facility: CLINIC | Age: 41
End: 2021-01-08

## 2021-01-08 PROCEDURE — 45378 DIAGNOSTIC COLONOSCOPY: CPT | Performed by: INTERNAL MEDICINE

## 2021-02-10 DIAGNOSIS — G89.29 CHRONIC SHOULDER PAIN, UNSPECIFIED LATERALITY: Primary | ICD-10-CM

## 2021-02-10 DIAGNOSIS — M25.519 CHRONIC SHOULDER PAIN, UNSPECIFIED LATERALITY: Primary | ICD-10-CM

## 2021-02-10 RX ORDER — ATORVASTATIN CALCIUM 40 MG/1
40 TABLET, FILM COATED ORAL DAILY
Qty: 30 TABLET | Refills: 2 | Status: SHIPPED | OUTPATIENT
Start: 2021-02-10 | End: 2021-06-03

## 2021-02-10 RX ORDER — OMEPRAZOLE 20 MG/1
20 CAPSULE, DELAYED RELEASE ORAL DAILY
Qty: 90 CAPSULE | Refills: 3 | Status: SHIPPED | OUTPATIENT
Start: 2021-02-10 | End: 2021-11-29 | Stop reason: SDUPTHER

## 2021-02-10 RX ORDER — ATOMOXETINE 60 MG/1
60 CAPSULE ORAL DAILY
Qty: 30 CAPSULE | Refills: 2 | Status: SHIPPED | OUTPATIENT
Start: 2021-02-10 | End: 2021-06-03

## 2021-02-10 RX ORDER — METHYLPREDNISOLONE 4 MG/1
TABLET ORAL
Qty: 21 TABLET | Refills: 0 | Status: SHIPPED | OUTPATIENT
Start: 2021-02-10 | End: 2021-03-09

## 2021-02-10 RX ORDER — TRAMADOL HYDROCHLORIDE 50 MG/1
50 TABLET ORAL EVERY 6 HOURS PRN
Qty: 28 TABLET | Refills: 0 | Status: SHIPPED | OUTPATIENT
Start: 2021-02-10 | End: 2021-03-09

## 2021-02-12 DIAGNOSIS — G89.29 CHRONIC SHOULDER PAIN, UNSPECIFIED LATERALITY: Primary | ICD-10-CM

## 2021-02-12 DIAGNOSIS — M25.519 CHRONIC SHOULDER PAIN, UNSPECIFIED LATERALITY: Primary | ICD-10-CM

## 2021-02-12 RX ORDER — HYDROCODONE BITARTRATE AND ACETAMINOPHEN 7.5; 325 MG/1; MG/1
1 TABLET ORAL EVERY 6 HOURS PRN
Qty: 12 TABLET | Refills: 0 | Status: SHIPPED | OUTPATIENT
Start: 2021-02-12 | End: 2021-03-09

## 2021-02-23 DIAGNOSIS — M25.612 DECREASED ROM OF LEFT SHOULDER: ICD-10-CM

## 2021-02-23 DIAGNOSIS — G89.29 CHRONIC LEFT SHOULDER PAIN: Primary | ICD-10-CM

## 2021-02-23 DIAGNOSIS — M25.512 CHRONIC LEFT SHOULDER PAIN: Primary | ICD-10-CM

## 2021-02-26 DIAGNOSIS — N52.9 ERECTILE DYSFUNCTION, UNSPECIFIED ERECTILE DYSFUNCTION TYPE: ICD-10-CM

## 2021-02-26 RX ORDER — SILDENAFIL 50 MG/1
50 TABLET, FILM COATED ORAL AS NEEDED
Qty: 30 TABLET | Refills: 5 | Status: SHIPPED | OUTPATIENT
Start: 2021-02-26 | End: 2023-03-27 | Stop reason: SDUPTHER

## 2021-03-01 RX ORDER — SILDENAFIL 50 MG/1
TABLET, FILM COATED ORAL
Qty: 30 TABLET | Refills: 1 | OUTPATIENT
Start: 2021-03-01

## 2021-03-09 ENCOUNTER — OFFICE VISIT (OUTPATIENT)
Dept: ORTHOPEDIC SURGERY | Facility: CLINIC | Age: 41
End: 2021-03-09

## 2021-03-09 VITALS
SYSTOLIC BLOOD PRESSURE: 157 MMHG | WEIGHT: 228.4 LBS | HEART RATE: 102 BPM | HEIGHT: 69 IN | DIASTOLIC BLOOD PRESSURE: 94 MMHG | BODY MASS INDEX: 33.83 KG/M2

## 2021-03-09 DIAGNOSIS — M19.012 ARTHRITIS OF LEFT ACROMIOCLAVICULAR JOINT: ICD-10-CM

## 2021-03-09 DIAGNOSIS — M75.52 BURSITIS OF LEFT SHOULDER: ICD-10-CM

## 2021-03-09 DIAGNOSIS — M75.42 IMPINGEMENT SYNDROME OF LEFT SHOULDER: ICD-10-CM

## 2021-03-09 DIAGNOSIS — M75.122 NONTRAUMATIC COMPLETE TEAR OF LEFT ROTATOR CUFF: Primary | ICD-10-CM

## 2021-03-09 PROCEDURE — 99204 OFFICE O/P NEW MOD 45 MIN: CPT | Performed by: ORTHOPAEDIC SURGERY

## 2021-03-09 PROCEDURE — 20605 DRAIN/INJ JOINT/BURSA W/O US: CPT | Performed by: ORTHOPAEDIC SURGERY

## 2021-03-09 RX ORDER — IBUPROFEN 400 MG/1
400 TABLET ORAL AS NEEDED
COMMUNITY
End: 2021-04-30

## 2021-03-09 RX ORDER — METHYLPREDNISOLONE ACETATE 80 MG/ML
80 INJECTION, SUSPENSION INTRA-ARTICULAR; INTRALESIONAL; INTRAMUSCULAR; SOFT TISSUE
Status: COMPLETED | OUTPATIENT
Start: 2021-03-09 | End: 2021-03-09

## 2021-03-09 RX ORDER — LIDOCAINE HYDROCHLORIDE 10 MG/ML
3 INJECTION, SOLUTION EPIDURAL; INFILTRATION; INTRACAUDAL; PERINEURAL
Status: COMPLETED | OUTPATIENT
Start: 2021-03-09 | End: 2021-03-09

## 2021-03-09 RX ADMIN — METHYLPREDNISOLONE ACETATE 80 MG: 80 INJECTION, SUSPENSION INTRA-ARTICULAR; INTRALESIONAL; INTRAMUSCULAR; SOFT TISSUE at 16:09

## 2021-03-09 RX ADMIN — LIDOCAINE HYDROCHLORIDE 3 ML: 10 INJECTION, SOLUTION EPIDURAL; INFILTRATION; INTRACAUDAL; PERINEURAL at 16:09

## 2021-03-09 NOTE — PROGRESS NOTES
[facility] Orthopaedic Surgery Office Visit - Rudy Méndez MD    Office Visit       Patient Name: Sunil Aparicio    Chief Complaint:   Chief Complaint   Patient presents with   • Left Shoulder - Pain       Referring Physician: Bridger Leblanc* --I appreciate the referral    History of Present Illness:   Sunil Aparicio is a 40 y.o. male who presents with left body part: shoulder Reason: pain.  Onset:Onset: atraumatic and gradual in nature. The issue has been ongoing for 1 year(s). Pain is a 7/10 on the pain scale. Pain is described as Pain Characterization: throbbing. Associated symptoms include Symptoms: grinding. The pain is worse with sleeping; heat and pain medication and/or NSAID improve the pain. Previous treatments have included: oral steroids.  I have reviewed the patient's history of present illness as noted/entered above.    I have reviewed the patient's past medical history, surgical history, social history, family history, medications, and allergies as noted in the electronic medical record and as noted/entered.  I have reviewed the patient's review of systems as noted/enter and updated as noted in the patient's HPI.    Left shoulder pain, MRI notable rotator cuff tear described as a 14 mm tear anterior to posterior outside hospital MRI  No specific trauma, but hard work over the years, football, weightlifting      I have known Sunil for many years since we were small kids in [facility].    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    History of diabetes    Left shoulder pain ongoing for least 1 year, difficulty sleeping with shoulder pain  He has been treated at [facility] orthopedics including a subacromial injection but no AC joint injection.    It appears that his primary pain generators his left AC joint arthritis.    He will obtain a copy of the CD of his left shoulder MRI  and provide that for review    Wife: Malinda ([facility] office) 3/28/2020, step-son works with him was going to go to [employer] for football and step-daughter      Subjective   Subjective      Review of Systems     Past Medical History:   Past Medical History:   Diagnosis Date   • ADD (attention deficit disorder)    • Diabetes (CMS/HCC)    • Hyperlipidemia        Past Surgical History:   Past Surgical History:   Procedure Laterality Date   • ELBOW FUSION Left        Family History:   Family History   Problem Relation Age of Onset   • Stroke Father        Social History:   Social History     Socioeconomic History   • Marital status:      Spouse name: Not on file   • Number of children: Not on file   • Years of education: Not on file   • Highest education level: Not on file   Tobacco Use   • Smoking status: Never Smoker   • Smokeless tobacco: Never Used   Substance and Sexual Activity   • Drug use: Never       Medications:   Current Outpatient Medications:   •  atomoxetine (STRATTERA) 60 MG capsule, Take 1 capsule by mouth Daily., Disp: 30 capsule, Rfl: 2  •  atorvastatin (LIPITOR) 40 MG tablet, Take 1 tablet by mouth Daily., Disp: 30 tablet, Rfl: 2  •  Blood Glucose Monitoring Suppl (ONE TOUCH ULTRA 2) w/Device kit, , Disp: , Rfl:   •  ibuprofen (ADVIL,MOTRIN) 400 MG tablet, Take 400 mg by mouth As Needed for Mild Pain ., Disp: , Rfl:   •  Lancets (ONETOUCH DELICA PLUS [id number]) misc, , Disp: , Rfl:   •  omeprazole (PrilOSEC) 20 MG capsule, Take 1 capsule by mouth Daily., Disp: 90 capsule, Rfl: 3  •  ONETOUCH ULTRA test strip, , Disp: , Rfl:   •  sildenafil (VIAGRA) 50 MG tablet, Take 1 tablet by mouth As Needed for Erectile Dysfunction., Disp: 30 tablet, Rfl: 5  •  Synjardy 12.5-1000 MG tablet, TAKE ONE TABLET BY MOUTH TWICE A DAY, Disp: 60 tablet, Rfl: 5  •  triamcinolone (KENALOG) 0.1 % cream, Apply  topically to the appropriate area as directed 2 (Two) Times a Day As Needed for Rash., Disp: 80 g, Rfl: 1  •   "meloxicam (MOBIC) 15 MG tablet, TAKE ONE TABLET BY MOUTH DAILY, Disp: 30 tablet, Rfl: 5    Allergies: No Known Allergies    The following portions of the patient's history were reviewed and updated as appropriate: allergies, current medications, past family history, past medical history, past social history, past surgical history and problem list.        Objective    Objective      Vital Signs:   Vitals:    03/09/21 1531   BP: 157/94   Pulse: 102   Weight: 104 kg (228 lb 6.4 oz)   Height: 175.3 cm (69\")       Ortho Exam:  General: no acute distress, comfortable  Vitals reviewed in chart  Head: normocephalic, atraumatic  Ears: no external drainage noted  Eyes: extraocular muscles appear intact with good tracking  Psych: alert and oriented, normal appearing mood  Vascular: 2+ pulses symmetric, well perfused  Neurologic:  Sensation to light touch intact distally  Dermatologic: skin not hot/red/swollen, left AC joint distal clavicle prominence consistent with his arthritic findings  Respiratory: breathing comfortably on room air, no intercostal retractions  Cardiovascular: regular rate and rhythm, well perfused      Musculoskeletal Exam    SIDE: Left shoulder  Shoulder Exam:    Tenderness: rotator cuff and primarily the left AC joint    Pain with cross body abduction and overhead press maneuver consistent with left AC joint findings    Range of motion measurements (degrees)  Forward flexion/Abduction/External rotation at side/ER at 90/IR at 90/IR position  Active: 140/140/60/80/70  Passive: intact    Painful arc of motion: yes  No evidence of septic joint  Pain with forward flexion and abduction greater: 90 degrees  Impingement testing Neer's test - positive/painful  Impingement testing Hawkin's test - positive/painful    Rotator Cuff Testing:  Tenderness to palpation at rotator cuff -pain  Rotator cuff testing Terrance's test -mild pain but masks with a strong deltoid  Rotator cuff testing External rotation - no  Rotator " cuff testing Lag signs - no  Rotator cuff testing Belly press - no  Pain with abduction great than 90 degrees - yes    Scapular dyskinesis - present, abnormal scapular motion    Long head of the biceps testing:  Nascimento's test for biceps & Speed's test -mild  Bicipital groove tenderness to palpation/tenderness to palpation of biceps tendon -mild      Symptomatic AC joint arthritis and prominence of the left AC joint.    Results Review:   Imaging Results (Last 24 Hours)     ** No results found for the last 24 hours. **        Left shoulder x-rays in the [facility] completed 11/6/2020-I reviewed the show some degenerative AC joint changes on the left.    Outside hospital MRI report only from [facility] left shoulder notable for a full-thickness 14 mm anterior to posterior supraspinatus tear by description.  I counseled the patient to provide a copy of the CD I will be happy to review this to confirm the findings of a full-thickness rotator cuff tear.    Procedures     LEFT SHOULDER ACROMIOCLAVICULAR JOINT INJECTION:  Risks and benefits of a shoulder acromioclavicular joint injection were discussed and the patient desired to proceed. Verbal consent was obtained. The patient understood the risk of infection, potential skin changes, bump in blood glucose especially with diabetes, nerve injury, possibility of increased pain in the short term, and possible incomplete pain relief. Using sterile technique, the shoulder acromioclavicular joint was injected from a superior approach with 1mL of 80mg/mL Depo Medrol and 3cc of lidocaine with aspiration prior to injection. The patient tolerated the procedure without difficulty.  CPT CODE 55876 for intermediate joint (AC joint) aspiration/injection        Assessment / Plan      Assessment/Plan:   Problem List Items Addressed This Visit        Musculoskeletal and Injuries    Nontraumatic complete tear of left rotator cuff - Primary    Impingement syndrome of  left shoulder    Bursitis of left shoulder    Arthritis of left acromioclavicular joint    Relevant Orders    Medium Joint Arthrocentesis: L acromioclavicular        Left full-thickness supraspinatus tear/degenerative and left AC joint arthritis.    He will provide me a copy of the CD.    We discussed operative versus nonoperative measures.  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  Unfortunately is a full-thickness tear at a young age and surgery is likely in his best interest at some point when he can afford the time off of work to proceed with this.  He would likely need a left distal clavicle resection at the same time given that his primary pain generator.    We will treat the left AC joint with an injection today.  He is interested in an injection given his primary pain generator.  He understands it we typically would wait about 6 weeks for any surgery if we do an injection.  He is in favor of holding off on any surgery at this point to see how he can continue to proceed with nonoperative measures.    He is contemplating surgery in April we discussed outpatient surgery, Covid testing, risks and benefits of surgery, recovery time, time in a sling, physical therapy.    Follow Up: I will see him back in the next few weeks to go over his left shoulder MRI and discuss possible arthroscopic rotator cuff repair and distal clavicle resection.    His wife is acquiring a CD and will drop off.  His best number is [phone] --I will touch base after I see the MRI to address his rotator cuff tear and AC joint arthritis.        Rudy Méndez MD, FAAOS  Orthopedic Surgeon  Fellowship Trained Shoulder and Elbow Surgeon  [facility]  Orthopedics and Sports Medicine  [address] [place] [address], [address]  [place] [address]    03/09/21  16:28 EST    Please note that portions of this note may have been  completed with a voice recognition program. Efforts were made to edit the dictations, but occasionally words are mistranscribed.

## 2021-03-09 NOTE — PROGRESS NOTES
Procedure   Medium Joint Arthrocentesis: L acromioclavicular  Date/Time: 3/9/2021 4:09 PM  Consent given by: patient  Site marked: site marked  Timeout: Immediately prior to procedure a time out was called to verify the correct patient, procedure, equipment, support staff and site/side marked as required   Supporting Documentation  Indications: pain   Procedure Details  Location: shoulder - L acromioclavicular  Preparation: Patient was prepped and draped in the usual sterile fashion  Needle size: 23 G  Approach: superior  Medications administered: 80 mg methylPREDNISolone acetate 80 MG/ML; 3 mL lidocaine PF 1% 1 %  Patient tolerance: patient tolerated the procedure well with no immediate complications

## 2021-03-12 ENCOUNTER — DOCUMENTATION (OUTPATIENT)
Dept: ORTHOPEDIC SURGERY | Facility: CLINIC | Age: 41
End: 2021-03-12

## 2021-03-12 NOTE — PROGRESS NOTES
PHONE CALL AND CD OF MRI REVIEW LEFT SHOULDER    I called Sunil to update him with regards to the MRI findings, left a voicemail    Shoulder MRI   I personally reviewed the patient's MRI and my personal findings are noted below.    SIDE: LEFT shoulder  Outside hospital MRI, [facility]    Findings:  Supraspinatus: Full-thickness tearing with 1+ cm retraction  Infraspinatus: Intact  Subscapularis: Intact for official read but does appear to have some superior border at least partial tearing  Teres minor: no obvious tear    Fatty infiltration of the rotator cuff: Negative  Muscle atrophy of the rotator cuff: Negative    Long head of the biceps: Tendinopathy    Labrum: SLAP 2 tear    AC joint: Significant AC joint arthritis    Glenohumeral joint: Intact degenerative labral tearing is noted    Fracture: no obvious fracture    Hill-Sachs lesion: absent

## 2021-03-19 DIAGNOSIS — M75.102 NONTRAUMATIC TEAR OF LEFT ROTATOR CUFF, UNSPECIFIED TEAR EXTENT: Primary | ICD-10-CM

## 2021-03-19 RX ORDER — OXYCODONE AND ACETAMINOPHEN 7.5; 325 MG/1; MG/1
1 TABLET ORAL EVERY 6 HOURS PRN
Qty: 12 TABLET | Refills: 0 | Status: SHIPPED | OUTPATIENT
Start: 2021-03-19 | End: 2021-05-25 | Stop reason: SDUPTHER

## 2021-03-30 ENCOUNTER — OFFICE VISIT (OUTPATIENT)
Dept: ORTHOPEDIC SURGERY | Facility: CLINIC | Age: 41
End: 2021-03-30

## 2021-03-30 VITALS
BODY MASS INDEX: 33.96 KG/M2 | SYSTOLIC BLOOD PRESSURE: 151 MMHG | DIASTOLIC BLOOD PRESSURE: 101 MMHG | WEIGHT: 229.28 LBS | HEIGHT: 69 IN | HEART RATE: 96 BPM

## 2021-03-30 DIAGNOSIS — S43.431A SUPERIOR GLENOID LABRUM LESION OF RIGHT SHOULDER, INITIAL ENCOUNTER: ICD-10-CM

## 2021-03-30 DIAGNOSIS — M19.012 ARTHRITIS OF LEFT ACROMIOCLAVICULAR JOINT: ICD-10-CM

## 2021-03-30 DIAGNOSIS — M75.52 BURSITIS OF LEFT SHOULDER: ICD-10-CM

## 2021-03-30 DIAGNOSIS — M75.122 NONTRAUMATIC COMPLETE TEAR OF LEFT ROTATOR CUFF: Primary | ICD-10-CM

## 2021-03-30 DIAGNOSIS — M75.42 IMPINGEMENT SYNDROME OF LEFT SHOULDER: ICD-10-CM

## 2021-03-30 PROCEDURE — 99213 OFFICE O/P EST LOW 20 MIN: CPT | Performed by: ORTHOPAEDIC SURGERY

## 2021-03-30 NOTE — PROGRESS NOTES
[facility] Orthopaedic Surgery Office Follow Up       Office Follow Up Visit       Patient Name: Sunil Aparicio    Chief Complaint:   Chief Complaint   Patient presents with   • Follow-up     3 week follow up - Nontraumatic complete tear of left rotator cuff        Referring Physician: No ref. provider found    History of Present Illness:   It has been 3  week(s) since Sunil Aparicio's last visit. Sunil Aparicio returns to clinic today for F/U: follow-up of leftBody Part: shoulderReason: pain. The issue has been ongoing for 1 year(s). Sunil Aparicio rates HIS/HER: hispain at 5/10 on the pain scale. Previous/current treatments: steroid injection (last injection 03/09/2021). Current symptoms:Symptoms: same as prior visit; ice and heat improves the pain. Overall, he/she: heis doing better.  I have reviewed the patient's history of present illness as noted/entered above.    I have reviewed the patient's past medical history, surgical history, social history, family history, medications, and allergies as noted in the electronic medical record and as noted/entered.  I have reviewed the patient's review of systems as noted/enter and updated as noted in the patient's HPI.    Left shoulder pain persists the AC joint injection did help - 3/9/2021    Overall he understands surgery will be necessary, discussed surgery vs no-surgery      Subjective   Subjective      Review of Systems   Constitutional: Negative.  Negative for chills, fatigue and fever.   HENT: Negative.  Negative for congestion and dental problem.    Eyes: Negative.  Negative for blurred vision.   Respiratory: Negative.  Negative for shortness of breath.    Cardiovascular: Negative.  Negative for leg swelling.   Gastrointestinal: Negative.  Negative for abdominal pain.   Endocrine: Negative.  Negative for polyuria.   Genitourinary: Negative.  Negative for difficulty urinating.   Musculoskeletal: Positive for  arthralgias.   Skin: Negative.    Allergic/Immunologic: Negative.    Neurological: Negative.    Hematological: Negative.  Negative for adenopathy.   Psychiatric/Behavioral: Negative.  Negative for behavioral problems.        Past Medical History:   Past Medical History:   Diagnosis Date   • ADD (attention deficit disorder)    • Diabetes (CMS/HCC)    • Hyperlipidemia        Past Surgical History:   Past Surgical History:   Procedure Laterality Date   • ELBOW FUSION Left        Family History:   Family History   Problem Relation Age of Onset   • Stroke Father        Social History:   Social History     Socioeconomic History   • Marital status:      Spouse name: Not on file   • Number of children: Not on file   • Years of education: Not on file   • Highest education level: Not on file   Tobacco Use   • Smoking status: Never Smoker   • Smokeless tobacco: Never Used   Substance and Sexual Activity   • Drug use: Never       Medications:   Current Outpatient Medications:   •  atomoxetine (STRATTERA) 60 MG capsule, Take 1 capsule by mouth Daily., Disp: 30 capsule, Rfl: 2  •  atorvastatin (LIPITOR) 40 MG tablet, Take 1 tablet by mouth Daily., Disp: 30 tablet, Rfl: 2  •  Blood Glucose Monitoring Suppl (ONE TOUCH ULTRA 2) w/Device kit, , Disp: , Rfl:   •  ibuprofen (ADVIL,MOTRIN) 400 MG tablet, Take 400 mg by mouth As Needed for Mild Pain ., Disp: , Rfl:   •  Lancets (ONETOUCH DELICA PLUS [id number]) misc, , Disp: , Rfl:   •  meloxicam (MOBIC) 15 MG tablet, TAKE ONE TABLET BY MOUTH DAILY, Disp: 30 tablet, Rfl: 5  •  omeprazole (PrilOSEC) 20 MG capsule, Take 1 capsule by mouth Daily., Disp: 90 capsule, Rfl: 3  •  ONETOUCH ULTRA test strip, , Disp: , Rfl:   •  oxyCODONE-acetaminophen (PERCOCET) 7.5-325 MG per tablet, Take 1 tablet by mouth Every 6 (Six) Hours As Needed for Moderate Pain  or Severe Pain ., Disp: 12 tablet, Rfl: 0  •  sildenafil (VIAGRA) 50 MG tablet, Take 1 tablet by mouth As Needed for Erectile  "Dysfunction., Disp: 30 tablet, Rfl: 5  •  Synjardy 12.5-1000 MG tablet, TAKE ONE TABLET BY MOUTH TWICE A DAY, Disp: 60 tablet, Rfl: 5  •  triamcinolone (KENALOG) 0.1 % cream, Apply  topically to the appropriate area as directed 2 (Two) Times a Day As Needed for Rash., Disp: 80 g, Rfl: 1    Allergies: No Known Allergies    The following portions of the patient's history were reviewed and updated as appropriate: allergies, current medications, past family history, past medical history, past social history, past surgical history and problem list.        Objective    Objective      Vital Signs:   Vitals:    03/30/21 0809   BP: (!) 151/101   Pulse: 96   Weight: 104 kg (229 lb 4.5 oz)   Height: 175.3 cm (69.02\")       Ortho Exam:  Left shoulder AC joint pain improved well-built strong deltoid and so has excellent compensation with rotator cuff testing  Negative anterior biceps pain  Good range of motion active and passive but positive impingement testing noted    Results Review:  Imaging Results (Last 24 Hours)     ** No results found for the last 24 hours. **        Shoulder MRI   I personally reviewed the patient's MRI and my personal findings are noted below.     SIDE: LEFT shoulder  Outside hospital MRI, [facility] 3/4/2021    Findings:  Supraspinatus: Full-thickness tearing with 1+ cm retraction  Infraspinatus: Intact  Subscapularis: Intact for official read but does appear to have some superior border at least partial tearing  Teres minor: no obvious tear     Fatty infiltration of the rotator cuff: Negative  Muscle atrophy of the rotator cuff: Negative     Long head of the biceps: Tendinopathy     Labrum: SLAP 2 tear     AC joint: Significant AC joint arthritis     Glenohumeral joint: Intact degenerative labral tearing is noted     Fracture: no obvious fracture     Hill-Sachs lesion: absent          Procedures          Assessment / Plan      Assessment/Plan:   Problem List Items Addressed This Visit     "    Musculoskeletal and Injuries    Nontraumatic complete tear of left rotator cuff - Primary    Relevant Orders    External Facility Surgical/Procedural Request    Impingement syndrome of left shoulder    Relevant Orders    External Facility Surgical/Procedural Request    Bursitis of left shoulder    Relevant Orders    External Facility Surgical/Procedural Request    Arthritis of left acromioclavicular joint    Relevant Orders    External Facility Surgical/Procedural Request    Superior glenoid labrum lesion of right shoulder    Relevant Orders    External Facility Surgical/Procedural Request          Counseled on surgery versus no surgery  Discussed with his wife Candi by phone as well    We discussed that surgery would be the recommendation at some point but he is in his busy season at work and does not hold off and if he can.  He has noted some improvements I do think we need to continue to follow this closely and would certainly need an MRI if it goes beyond 6 months of nonsurgical care.  Counseled is likely been present for some time he has excellent strength on clinical exam    Appointment time and discussion 23 minutes    Follow Up: 6-8 weeks      Ruyd Méndez MD, FAAOS  Orthopedic Surgeon  Fellowship Trained Shoulder and Elbow Surgeon  [facility]  Orthopedics and Sports Medicine  [address] [place] [address], [address]  [place] [address]    03/30/21  08:49 EDT    Please note that portions of this note may have been completed with a voice recognition program. Efforts were made to edit the dictations, but occasionally words are mistranscribed.

## 2021-04-09 ENCOUNTER — TELEPHONE (OUTPATIENT)
Dept: INTERNAL MEDICINE | Facility: CLINIC | Age: 41
End: 2021-04-09

## 2021-04-09 RX ORDER — LISINOPRIL 10 MG/1
10 TABLET ORAL DAILY
Qty: 30 TABLET | Refills: 1 | Status: SHIPPED | OUTPATIENT
Start: 2021-04-09 | End: 2021-04-30

## 2021-04-09 NOTE — TELEPHONE ENCOUNTER
Pt has had an elevated b/p at his last few ortho appts. Pt has been monitoring his b/p in the am and pm for the past 2 weeks. His bystolic is ranging from the upper 140's into the upper 150's. His diastolic pressure is staying in the upper 90's with heart rate averaging in the 90's. Pt has never taken anything for hypertension.

## 2021-04-09 NOTE — TELEPHONE ENCOUNTER
I sent lisinopril 10mg to the pharmacy. Have him monitor blood pressure and keep log. Have him follow up in about 2 weeks.

## 2021-04-30 ENCOUNTER — OFFICE VISIT (OUTPATIENT)
Dept: INTERNAL MEDICINE | Facility: CLINIC | Age: 41
End: 2021-04-30

## 2021-04-30 VITALS
DIASTOLIC BLOOD PRESSURE: 82 MMHG | WEIGHT: 230 LBS | HEART RATE: 71 BPM | TEMPERATURE: 97.1 F | SYSTOLIC BLOOD PRESSURE: 132 MMHG | RESPIRATION RATE: 16 BRPM | BODY MASS INDEX: 32.93 KG/M2 | OXYGEN SATURATION: 98 % | HEIGHT: 70 IN

## 2021-04-30 DIAGNOSIS — G89.29 CHRONIC LEFT SHOULDER PAIN: ICD-10-CM

## 2021-04-30 DIAGNOSIS — E78.5 HYPERLIPIDEMIA, UNSPECIFIED HYPERLIPIDEMIA TYPE: ICD-10-CM

## 2021-04-30 DIAGNOSIS — M25.512 CHRONIC LEFT SHOULDER PAIN: ICD-10-CM

## 2021-04-30 DIAGNOSIS — E11.9 TYPE 2 DIABETES MELLITUS WITHOUT COMPLICATION, WITHOUT LONG-TERM CURRENT USE OF INSULIN (HCC): Primary | ICD-10-CM

## 2021-04-30 DIAGNOSIS — I10 ESSENTIAL HYPERTENSION: ICD-10-CM

## 2021-04-30 LAB — HBA1C MFR BLD: 8.2 %

## 2021-04-30 PROCEDURE — 99214 OFFICE O/P EST MOD 30 MIN: CPT | Performed by: NURSE PRACTITIONER

## 2021-04-30 PROCEDURE — 83036 HEMOGLOBIN GLYCOSYLATED A1C: CPT | Performed by: NURSE PRACTITIONER

## 2021-05-04 RX ORDER — CYCLOBENZAPRINE HCL 10 MG
10 TABLET ORAL 3 TIMES DAILY PRN
Qty: 60 TABLET | Refills: 0 | Status: SHIPPED | OUTPATIENT
Start: 2021-05-04 | End: 2021-08-12

## 2021-05-04 RX ORDER — PREDNISONE 1 MG/1
TABLET ORAL
Qty: 21 TABLET | Refills: 0 | Status: SHIPPED | OUTPATIENT
Start: 2021-05-04 | End: 2021-05-25

## 2021-05-11 DIAGNOSIS — R11.0 NAUSEA: Primary | ICD-10-CM

## 2021-05-11 RX ORDER — PROMETHAZINE HYDROCHLORIDE 25 MG/1
25 TABLET ORAL EVERY 6 HOURS PRN
Qty: 30 TABLET | Refills: 0 | Status: SHIPPED | OUTPATIENT
Start: 2021-05-11 | End: 2021-07-26 | Stop reason: SDUPTHER

## 2021-05-19 PROBLEM — I10 ESSENTIAL HYPERTENSION: Status: ACTIVE | Noted: 2021-05-19

## 2021-05-19 RX ORDER — NEBIVOLOL 10 MG/1
10 TABLET ORAL DAILY
Qty: 30 TABLET | Refills: 0
Start: 2021-05-19 | End: 2021-12-10 | Stop reason: SDUPTHER

## 2021-05-25 DIAGNOSIS — M75.102 NONTRAUMATIC TEAR OF LEFT ROTATOR CUFF, UNSPECIFIED TEAR EXTENT: ICD-10-CM

## 2021-05-25 RX ORDER — OXYCODONE AND ACETAMINOPHEN 7.5; 325 MG/1; MG/1
1 TABLET ORAL EVERY 6 HOURS PRN
Qty: 12 TABLET | Refills: 0 | Status: SHIPPED | OUTPATIENT
Start: 2021-05-25 | End: 2021-07-26 | Stop reason: SDUPTHER

## 2021-05-27 ENCOUNTER — OFFICE VISIT (OUTPATIENT)
Dept: ORTHOPEDIC SURGERY | Facility: CLINIC | Age: 41
End: 2021-05-27

## 2021-05-27 VITALS
WEIGHT: 229.28 LBS | BODY MASS INDEX: 32.82 KG/M2 | HEIGHT: 70 IN | DIASTOLIC BLOOD PRESSURE: 85 MMHG | SYSTOLIC BLOOD PRESSURE: 140 MMHG | HEART RATE: 76 BPM

## 2021-05-27 DIAGNOSIS — M75.42 IMPINGEMENT SYNDROME OF LEFT SHOULDER: ICD-10-CM

## 2021-05-27 DIAGNOSIS — M75.122 NONTRAUMATIC COMPLETE TEAR OF LEFT ROTATOR CUFF: Primary | ICD-10-CM

## 2021-05-27 DIAGNOSIS — M19.012 ARTHRITIS OF LEFT ACROMIOCLAVICULAR JOINT: ICD-10-CM

## 2021-05-27 DIAGNOSIS — S43.431A SUPERIOR GLENOID LABRUM LESION OF RIGHT SHOULDER, INITIAL ENCOUNTER: ICD-10-CM

## 2021-05-27 DIAGNOSIS — M75.52 BURSITIS OF LEFT SHOULDER: ICD-10-CM

## 2021-05-27 PROCEDURE — 20605 DRAIN/INJ JOINT/BURSA W/O US: CPT | Performed by: ORTHOPAEDIC SURGERY

## 2021-05-27 PROCEDURE — 99213 OFFICE O/P EST LOW 20 MIN: CPT | Performed by: ORTHOPAEDIC SURGERY

## 2021-05-27 RX ORDER — METHYLPREDNISOLONE ACETATE 80 MG/ML
80 INJECTION, SUSPENSION INTRA-ARTICULAR; INTRALESIONAL; INTRAMUSCULAR; SOFT TISSUE
Status: COMPLETED | OUTPATIENT
Start: 2021-05-27 | End: 2021-05-27

## 2021-05-27 RX ORDER — LIDOCAINE HYDROCHLORIDE 10 MG/ML
3 INJECTION, SOLUTION INFILTRATION; PERINEURAL
Status: COMPLETED | OUTPATIENT
Start: 2021-05-27 | End: 2021-05-27

## 2021-05-27 RX ADMIN — METHYLPREDNISOLONE ACETATE 80 MG: 80 INJECTION, SUSPENSION INTRA-ARTICULAR; INTRALESIONAL; INTRAMUSCULAR; SOFT TISSUE at 15:57

## 2021-05-27 RX ADMIN — LIDOCAINE HYDROCHLORIDE 3 ML: 10 INJECTION, SOLUTION INFILTRATION; PERINEURAL at 15:57

## 2021-05-27 NOTE — PROGRESS NOTES
Procedure   Medium Joint Arthrocentesis: L acromioclavicular  Date/Time: 5/27/2021 3:57 PM  Consent given by: patient  Site marked: site marked  Timeout: Immediately prior to procedure a time out was called to verify the correct patient, procedure, equipment, support staff and site/side marked as required   Supporting Documentation  Indications: pain   Procedure Details  Location: shoulder - L acromioclavicular  Preparation: Patient was prepped and draped in the usual sterile fashion  Needle size: 23 G  Approach: superior  Medications administered: 80 mg methylPREDNISolone acetate 80 MG/ML; 3 mL lidocaine 1 %  Patient tolerance: patient tolerated the procedure well with no immediate complications

## 2021-05-27 NOTE — PROGRESS NOTES
[facility] Orthopaedic Surgery Office Follow Up       Office Follow Up Visit       Patient Name: Sunil Aparicio    Chief Complaint:   Chief Complaint   Patient presents with   • Follow-up     8 week follow up; Nontraumatic complete tear of left rotator cuff-left AC joint injection given on 3/9/21       Referring Physician: No ref. provider found    History of Present Illness:   It has been 8  week(s) since Sunil Aparicio's last visit. Sunil Aparicio returns to clinic today for F/U: follow-up of leftBody Part: shoulderReason: pain. The issue has been ongoing for 1 year(s). Sunil Aparicio rates HIS/HER: hispain at 3/10 on the pain scale. Previous/current treatments: NSAIDS, oral steroids and steroid injection (last injection 03/09/2021). Current symptoms:Symptoms: same as prior visit. The pain is worse with sleeping and lying on affected side; resting and heat improves the pain. Overall, he/she: heis doing the same. I have reviewed the patient's history of present illness as noted/entered above.    I have reviewed the patient's past medical history, surgical history, social history, family history, medications, and allergies as noted in the electronic medical record and as noted/entered.  I have reviewed the patient's review of systems as noted/enter and updated as noted in the patient's HPI.      LEFT SHOULDER  Pain persists  Very active, heavy duty labor  Busy time at work/supporting his family  Primary pain generator is his LEFT AC JOINT, good strength  Counseled on op vs nonop  He notes he needs to get through work this summer  Camping and gave her an area of the summer    PRIOR:  History of Present Illness:   It has been 3  week(s) since Sunil Aparicio's last visit. Sunil Aparicio returns to clinic today for F/U: follow-up of leftBody Part: shoulderReason: pain. The issue has been ongoing for 1 year(s). Sunil Aparicio rates HIS/HER: hispain at 5/10 on the pain  scale. Previous/current treatments: steroid injection (last injection 03/09/2021). Current symptoms:Symptoms: same as prior visit; ice and heat improves the pain. Overall, he/she: heis doing better.  I have reviewed the patient's history of present illness as noted/entered above.     I have reviewed the patient's past medical history, surgical history, social history, family history, medications, and allergies as noted in the electronic medical record and as noted/entered.  I have reviewed the patient's review of systems as noted/enter and updated as noted in the patient's HPI.     Left shoulder pain persists the AC joint injection did help - 3/9/2021     Overall he understands surgery will be necessary, discussed surgery vs no-surgery      Shoulder MRI   I personally reviewed the patient's MRI and my personal findings are noted below.     SIDE: LEFT shoulder  Outside hospital MRI, [facility] 3/4/2021    Findings:  Supraspinatus: Full-thickness tearing with 1+ cm retraction  Infraspinatus: Intact  Subscapularis: Intact for official read but does appear to have some superior border at least partial tearing  Teres minor: no obvious tear     Fatty infiltration of the rotator cuff: Negative  Muscle atrophy of the rotator cuff: Negative     Long head of the biceps: Tendinopathy     Labrum: SLAP 2 tear     AC joint: Significant AC joint arthritis     Glenohumeral joint: Intact degenerative labral tearing is noted     Fracture: no obvious fracture     Hill-Sachs lesion: absent      Subjective   Subjective      Review of Systems   Constitutional: Negative.    HENT: Negative.    Eyes: Negative.    Respiratory: Negative.    Cardiovascular: Negative.    Gastrointestinal: Negative.    Endocrine: Negative.    Genitourinary: Negative.    Musculoskeletal: Positive for arthralgias.   Skin: Negative.    Allergic/Immunologic: Negative.    Neurological: Negative.    Hematological: Negative.    Psychiatric/Behavioral:  Negative.         Past Medical History:   Past Medical History:   Diagnosis Date   • ADD (attention deficit disorder)    • Diabetes (CMS/HCC)    • Hyperlipidemia        Past Surgical History:   Past Surgical History:   Procedure Laterality Date   • ELBOW FUSION Left        Family History:   Family History   Problem Relation Age of Onset   • Stroke Father        Social History:   Social History     Socioeconomic History   • Marital status:      Spouse name: Not on file   • Number of children: Not on file   • Years of education: Not on file   • Highest education level: Not on file   Tobacco Use   • Smoking status: Never Smoker   • Smokeless tobacco: Never Used   Substance and Sexual Activity   • Drug use: Never       Medications:   Current Outpatient Medications:   •  atomoxetine (STRATTERA) 60 MG capsule, Take 1 capsule by mouth Daily., Disp: 30 capsule, Rfl: 2  •  atorvastatin (LIPITOR) 40 MG tablet, Take 1 tablet by mouth Daily., Disp: 30 tablet, Rfl: 2  •  Blood Glucose Monitoring Suppl (ONE TOUCH ULTRA 2) w/Device kit, , Disp: , Rfl:   •  cyclobenzaprine (FLEXERIL) 10 MG tablet, Take 1 tablet by mouth 3 (Three) Times a Day As Needed for Muscle Spasms., Disp: 60 tablet, Rfl: 0  •  Lancets (ONETOUCH DELICA PLUS [id number]) misc, , Disp: , Rfl:   •  meloxicam (MOBIC) 15 MG tablet, TAKE ONE TABLET BY MOUTH DAILY, Disp: 30 tablet, Rfl: 5  •  nebivolol (Bystolic) 10 MG tablet, Take 1 tablet by mouth Daily., Disp: 30 tablet, Rfl: 0  •  omeprazole (PrilOSEC) 20 MG capsule, Take 1 capsule by mouth Daily., Disp: 90 capsule, Rfl: 3  •  ONETOUCH ULTRA test strip, , Disp: , Rfl:   •  promethazine (PHENERGAN) 25 MG tablet, Take 1 tablet by mouth Every 6 (Six) Hours As Needed for Nausea or Vomiting., Disp: 30 tablet, Rfl: 0  •  sildenafil (VIAGRA) 50 MG tablet, Take 1 tablet by mouth As Needed for Erectile Dysfunction., Disp: 30 tablet, Rfl: 5  •  Synjardy 12.5-1000 MG tablet, TAKE ONE TABLET BY MOUTH TWICE A DAY, Disp:  "60 tablet, Rfl: 5  •  oxyCODONE-acetaminophen (PERCOCET) 7.5-325 MG per tablet, Take 1 tablet by mouth Every 6 (Six) Hours As Needed for Moderate Pain  or Severe Pain ., Disp: 12 tablet, Rfl: 0    Allergies: No Known Allergies    The following portions of the patient's history were reviewed and updated as appropriate: allergies, current medications, past family history, past medical history, past social history, past surgical history and problem list.        Objective    Objective      Vital Signs:   Vitals:    05/27/21 1532   BP: 140/85   Pulse: 76   Weight: 104 kg (229 lb 4.5 oz)   Height: 177.8 cm (70\")       Ortho Exam:  LEFT SHOULDER AC joint pain pain has returned  Stoic and good strength despite tear  Still has preserved active and passive motion some impingement syndrome and pain with cross body abduction    Results Review:  Imaging Results (Last 24 Hours)     ** No results found for the last 24 hours. **      Procedures    LEFT SHOULDER ACROMIOCLAVICULAR JOINT INJECTION:  Risks and benefits of a shoulder acromioclavicular joint injection were discussed and the patient desired to proceed. Verbal consent was obtained. The patient understood the risk of infection, potential skin changes, bump in blood glucose especially with diabetes, nerve injury, possibility of increased pain in the short term, and possible incomplete pain relief. Using sterile technique, the shoulder acromioclavicular joint was injected from a superior approach with 1mL of 80mg/mL Depo Medrol and 4cc of lidocaine with aspiration prior to injection. The patient tolerated the procedure without difficulty.  CPT CODE 03526 for intermediate joint (AC joint) aspiration/injection          Assessment / Plan      Assessment/Plan:   Problem List Items Addressed This Visit        Musculoskeletal and Injuries    Nontraumatic complete tear of left rotator cuff - Primary    Impingement syndrome of left shoulder    Bursitis of left shoulder    Arthritis " of left acromioclavicular joint    Superior glenoid labrum lesion of right shoulder          Left shoulder AC joint arthritis and pain, rotator cuff tear, SLAP tear.    Sunil is stoic and really trying to avoid surgery as long as he can is get a busy work season this summer and hopeful an injection will help to buy some more time with regards to the AC joint pain.  We will see him back in 2 to 3 months to reassess he is trying to make accommodations to be out have surgery understanding the tear could progress over time.    Follow Up: 2-3 months      Rudy Méndez MD, FAAOS  Orthopedic Surgeon  Fellowship Trained Shoulder and Elbow Surgeon  [facility]  Orthopedics and Sports Medicine  [address] [place] [address], [address]  [place] [address]    05/27/21  15:59 EDT    Please note that portions of this note may have been completed with a voice recognition program. Efforts were made to edit the dictations, but occasionally words are mistranscribed.

## 2021-06-03 RX ORDER — EMPAGLIFLOZIN AND METFORMIN HYDROCHLORIDE 12.5; 1 MG/1; MG/1
TABLET ORAL
Qty: 180 TABLET | Refills: 1 | Status: SHIPPED | OUTPATIENT
Start: 2021-06-03 | End: 2021-11-29 | Stop reason: SDUPTHER

## 2021-06-03 RX ORDER — ATOMOXETINE 60 MG/1
CAPSULE ORAL
Qty: 90 CAPSULE | Refills: 1 | Status: SHIPPED | OUTPATIENT
Start: 2021-06-03 | End: 2021-11-29 | Stop reason: SDUPTHER

## 2021-06-03 RX ORDER — MELOXICAM 15 MG/1
TABLET ORAL
Qty: 90 TABLET | Refills: 1 | Status: SHIPPED | OUTPATIENT
Start: 2021-06-03 | End: 2021-07-29

## 2021-06-03 RX ORDER — ATORVASTATIN CALCIUM 40 MG/1
TABLET, FILM COATED ORAL
Qty: 90 TABLET | Refills: 1 | Status: SHIPPED | OUTPATIENT
Start: 2021-06-03 | End: 2021-11-29 | Stop reason: SDUPTHER

## 2021-06-10 RX ORDER — CLINDAMYCIN PHOSPHATE 10 MG/G
GEL TOPICAL
Qty: 60 G | Refills: 1 | Status: SHIPPED | OUTPATIENT
Start: 2021-06-10 | End: 2021-08-31

## 2021-06-10 RX ORDER — BENZOYL PEROXIDE 100 MG/ML
LIQUID TOPICAL
Qty: 227 G | Refills: 1 | Status: SHIPPED | OUTPATIENT
Start: 2021-06-10 | End: 2021-08-12

## 2021-06-11 ENCOUNTER — PRIOR AUTHORIZATION (OUTPATIENT)
Dept: INTERNAL MEDICINE | Facility: CLINIC | Age: 41
End: 2021-06-11

## 2021-06-11 NOTE — TELEPHONE ENCOUNTER
Prior Authorization for Benzoyl Peroxide Wash 10%Liquid received, but unable to find Diagnosis and Dx code. Please advise, Thanks  ROBLES:[id number]  PA Case ID:

## 2021-06-14 NOTE — TELEPHONE ENCOUNTER
Prior Authorization for Benzoyl Peroxide Wash 10% liquid completed and sent to plan via Covermymeds. Status pending;   KEY:[id number]

## 2021-07-22 ENCOUNTER — TELEPHONE (OUTPATIENT)
Dept: ORTHOPEDIC SURGERY | Facility: CLINIC | Age: 41
End: 2021-07-22

## 2021-07-23 ENCOUNTER — TELEPHONE (OUTPATIENT)
Dept: ORTHOPEDIC SURGERY | Facility: CLINIC | Age: 41
End: 2021-07-23

## 2021-07-26 DIAGNOSIS — R11.0 NAUSEA: ICD-10-CM

## 2021-07-26 DIAGNOSIS — M75.102 NONTRAUMATIC TEAR OF LEFT ROTATOR CUFF, UNSPECIFIED TEAR EXTENT: ICD-10-CM

## 2021-07-26 RX ORDER — OXYCODONE AND ACETAMINOPHEN 7.5; 325 MG/1; MG/1
1 TABLET ORAL EVERY 6 HOURS PRN
Qty: 12 TABLET | Refills: 0 | Status: SHIPPED | OUTPATIENT
Start: 2021-07-26 | End: 2021-08-30 | Stop reason: SDUPTHER

## 2021-07-26 RX ORDER — PROMETHAZINE HYDROCHLORIDE 25 MG/1
25 TABLET ORAL EVERY 6 HOURS PRN
Qty: 30 TABLET | Refills: 0 | Status: SHIPPED | OUTPATIENT
Start: 2021-07-26 | End: 2021-08-30 | Stop reason: SDUPTHER

## 2021-07-26 RX ORDER — OXYCODONE AND ACETAMINOPHEN 7.5; 325 MG/1; MG/1
1 TABLET ORAL EVERY 6 HOURS PRN
Qty: 12 TABLET | Refills: 0 | Status: CANCELLED | OUTPATIENT
Start: 2021-07-26

## 2021-07-26 RX ORDER — PROMETHAZINE HYDROCHLORIDE 25 MG/1
25 TABLET ORAL EVERY 6 HOURS PRN
Qty: 30 TABLET | Refills: 0 | Status: CANCELLED | OUTPATIENT
Start: 2021-07-26

## 2021-07-28 ENCOUNTER — TELEPHONE (OUTPATIENT)
Dept: ORTHOPEDIC SURGERY | Facility: CLINIC | Age: 41
End: 2021-07-28

## 2021-07-29 RX ORDER — CELECOXIB 200 MG/1
200 CAPSULE ORAL DAILY
Qty: 30 CAPSULE | Refills: 5 | Status: SHIPPED | OUTPATIENT
Start: 2021-07-29 | End: 2021-08-12

## 2021-08-20 ENCOUNTER — OFFICE VISIT (OUTPATIENT)
Dept: INTERNAL MEDICINE | Facility: CLINIC | Age: 41
End: 2021-08-20

## 2021-08-20 VITALS
WEIGHT: 234 LBS | OXYGEN SATURATION: 98 % | DIASTOLIC BLOOD PRESSURE: 84 MMHG | TEMPERATURE: 96.8 F | BODY MASS INDEX: 33.5 KG/M2 | RESPIRATION RATE: 16 BRPM | HEIGHT: 70 IN | SYSTOLIC BLOOD PRESSURE: 126 MMHG | HEART RATE: 65 BPM

## 2021-08-20 DIAGNOSIS — E11.9 TYPE 2 DIABETES MELLITUS WITHOUT COMPLICATION, WITHOUT LONG-TERM CURRENT USE OF INSULIN (HCC): ICD-10-CM

## 2021-08-20 DIAGNOSIS — Z02.89 ENCOUNTER FOR EXAMINATION REQUIRED BY DEPARTMENT OF TRANSPORTATION (DOT): Primary | ICD-10-CM

## 2021-08-20 LAB
BILIRUB BLD-MCNC: NEGATIVE MG/DL
CLARITY, POC: CLEAR
COLOR UR: YELLOW
GLUCOSE UR STRIP-MCNC: NEGATIVE MG/DL
HBA1C MFR BLD: 8.2 %
KETONES UR QL: NEGATIVE
LEUKOCYTE EST, POC: NEGATIVE
NITRITE UR-MCNC: NEGATIVE MG/ML
PH UR: 5.5 [PH] (ref 5–8)
PROT UR STRIP-MCNC: NEGATIVE MG/DL
RBC # UR STRIP: NEGATIVE /UL
SP GR UR: 1.02 (ref 1–1.03)
UROBILINOGEN UR QL: NORMAL

## 2021-08-20 PROCEDURE — 83036 HEMOGLOBIN GLYCOSYLATED A1C: CPT | Performed by: NURSE PRACTITIONER

## 2021-08-20 PROCEDURE — 99213 OFFICE O/P EST LOW 20 MIN: CPT | Performed by: NURSE PRACTITIONER

## 2021-08-20 PROCEDURE — 81003 URINALYSIS AUTO W/O SCOPE: CPT | Performed by: NURSE PRACTITIONER

## 2021-08-20 NOTE — PROGRESS NOTES
"Chief Complaint   Patient presents with   • Employment Physical       HPI  Sunil Aparicio is a 40 y.o. male presents for a DOT physical.  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  Pt has a history of diabetes for about 4-5 years.  A1c 8.2 today.  No insulin.  Wears glasses at night and with reading.  Takes meds as prescribed.      The following portions of the patient's history were reviewed and updated as appropriate: allergies, current medications, past family history, past medical history, past social history, past surgical history and problem list.    Subjective  Review of Systems   Constitutional: Negative for activity change, appetite change and fatigue.   HENT: Negative for congestion.    Respiratory: Negative for cough and shortness of breath.    Cardiovascular: Negative for chest pain and leg swelling.   Gastrointestinal: Negative for abdominal pain.   Endocrine: Negative for polydipsia and polyuria.   Neurological: Negative for dizziness, weakness and confusion.   Psychiatric/Behavioral: Negative for behavioral problems and decreased concentration.       Objective  Visit Vitals  /84   Pulse 65   Temp 96.8 °F (36 °C) (Temporal)   Resp 16   Ht 177.8 cm (70\")   Wt 106 kg (234 lb)   SpO2 98%   BMI 33.58 kg/m²        Physical Exam  Vitals and nursing note reviewed.   HENT:      Head: Normocephalic.   Eyes:      Pupils: Pupils are equal, round, and reactive to light.   Cardiovascular:      Rate and Rhythm: Normal rate and regular rhythm.      Pulses: Normal pulses.           Carotid pulses are 2+ on the right side and 2+ on the left side.       Dorsalis pedis pulses are 2+ on the right side and 2+ on the left side.      Heart sounds: Normal heart sounds.   Pulmonary:      Effort: Pulmonary effort is normal.      Breath sounds: Normal breath sounds.   Abdominal:      General: Bowel sounds are normal. There is no distension.      Palpations: Abdomen is soft. There is no mass.      Tenderness: " There is no abdominal tenderness. There is no right CVA tenderness or left CVA tenderness.      Hernia: No hernia is present.   Musculoskeletal:      Cervical back: Normal.      Thoracic back: Normal.      Lumbar back: Normal.      Right lower leg: No edema.      Left lower leg: No edema.   Skin:     General: Skin is warm and dry.      Capillary Refill: Capillary refill takes less than 2 seconds.   Neurological:      General: No focal deficit present.      Mental Status: He is alert and oriented to person, place, and time.      Cranial Nerves: Cranial nerves are intact.      Motor: Motor function is intact.      Coordination: Coordination is intact.      Gait: Gait is intact.   Psychiatric:         Attention and Perception: Attention normal.         Mood and Affect: Mood normal.         Behavior: Behavior normal.          Procedures     Results for orders placed or performed in visit on 08/20/21   POCT urinalysis dipstick, automated    Specimen: Urine   Result Value Ref Range    Color Yellow Yellow, Straw, Dark Yellow, [place]    Clarity, UA Clear Clear    Specific Gravity  1.020 1.005 - 1.030    pH, Urine 5.5 5.0 - 8.0    Leukocytes Negative Negative    Nitrite, UA Negative Negative    Protein, POC Negative Negative mg/dL    Glucose, UA Negative Negative, 1000 mg/dL (3+) mg/dL    Ketones, UA Negative Negative    Urobilinogen, UA Normal Normal    Bilirubin Negative Negative    Blood, UA Negative Negative   POCT glycated hemoglobin, total    Specimen: Urine   Result Value Ref Range    Hemoglobin A1C 8.2 %     Assessment and Plan  Diagnoses and all orders for this visit:    1. Encounter for examination required by Department of Transportation (DOT) (Primary)    2. Type 2 diabetes mellitus without complication, without long-term current use of insulin (CMS/[facility])  -     POCT urinalysis dipstick, automated  -     POCT glycated hemoglobin, total    1 year certification due to DM and HTN  Pt instructed to notify office if any  changes in health  See regular PCP for Diabetes    Return if symptoms worsen or fail to improve.        Yusef Dye, APRN

## 2021-08-30 DIAGNOSIS — M75.102 NONTRAUMATIC TEAR OF LEFT ROTATOR CUFF, UNSPECIFIED TEAR EXTENT: ICD-10-CM

## 2021-08-30 DIAGNOSIS — R11.0 NAUSEA: ICD-10-CM

## 2021-08-30 RX ORDER — PROMETHAZINE HYDROCHLORIDE 25 MG/1
25 TABLET ORAL EVERY 6 HOURS PRN
Qty: 30 TABLET | Refills: 0 | Status: SHIPPED | OUTPATIENT
Start: 2021-08-30 | End: 2021-12-03 | Stop reason: SDUPTHER

## 2021-08-30 RX ORDER — OXYCODONE AND ACETAMINOPHEN 7.5; 325 MG/1; MG/1
1 TABLET ORAL EVERY 6 HOURS PRN
Qty: 12 TABLET | Refills: 0 | Status: SHIPPED | OUTPATIENT
Start: 2021-08-30 | End: 2021-09-09 | Stop reason: SDUPTHER

## 2021-08-31 ENCOUNTER — OFFICE VISIT (OUTPATIENT)
Dept: ORTHOPEDIC SURGERY | Facility: CLINIC | Age: 41
End: 2021-08-31

## 2021-08-31 VITALS
BODY MASS INDEX: 33.46 KG/M2 | SYSTOLIC BLOOD PRESSURE: 118 MMHG | DIASTOLIC BLOOD PRESSURE: 78 MMHG | WEIGHT: 233.69 LBS | HEIGHT: 70 IN

## 2021-08-31 DIAGNOSIS — S43.431A SUPERIOR GLENOID LABRUM LESION OF RIGHT SHOULDER, INITIAL ENCOUNTER: ICD-10-CM

## 2021-08-31 DIAGNOSIS — M19.012 ARTHRITIS OF LEFT ACROMIOCLAVICULAR JOINT: ICD-10-CM

## 2021-08-31 DIAGNOSIS — M75.52 BURSITIS OF LEFT SHOULDER: ICD-10-CM

## 2021-08-31 DIAGNOSIS — M75.122 NONTRAUMATIC COMPLETE TEAR OF LEFT ROTATOR CUFF: Primary | ICD-10-CM

## 2021-08-31 PROCEDURE — 99214 OFFICE O/P EST MOD 30 MIN: CPT | Performed by: ORTHOPAEDIC SURGERY

## 2021-08-31 NOTE — PROGRESS NOTES
[facility] Office Follow Up       Office Follow Up Visit       Patient Name: Sunil Aparicio    Chief Complaint:   Chief Complaint   Patient presents with   • Follow-up     3 month f/u--Nontraumatic complete tear of left rotator cuff-left        Referring Physician: No ref. provider found    History of Present Illness:   It has been 3  month(s) since Sunil Aparicio's last visit. Sunil Aparicio returns to clinic today for F/U: follow-up of leftBody Part: shoulderReason: pain. The issue has been ongoing for 1 year(s). Sunil Aparicio rates HIS/HER: hispain at 5/10 on the pain scale. Previous/current treatments: NSAIDS. Current symptoms:Symptoms: pain and grinding. The pain is worse with certain movements; pain medication and/or NSAID improves the pain. Overall, he/she: heis doing worse.  I have reviewed the patient's history of present illness as noted/entered above.    I have reviewed the patient's past medical history, surgical history, social history, family history, medications, and allergies as noted in the electronic medical record and as noted/entered.  I have reviewed the patient's review of systems as noted/enter and updated as noted in the patient's HPI.    8/31/2021: Left shoulder pain persist AC joint pain, rotator cuff pain now more apparent perhaps mild anterior biceps pain but primarily AC joint and rotator cuff  He desires to discuss surgery and set it up accordingly.      Prior:  LEFT SHOULDER  Pain persists  Very active, heavy duty labor  Busy time at work/supporting his family  Primary pain generator is his LEFT AC JOINT, good strength  Counseled on op vs nonop  He notes he needs to get through work this summer  Camping and gave her an area of the summer     PRIOR:  History of Present Illness:   It has been 3  week(s) since Sunil Aparicio's last visit. Sunil Aparicio returns to clinic today for F/U: follow-up of leftBody Part:  shoulderReason: pain. The issue has been ongoing for 1 year(s). Sunil Aparicio rates HIS/HER: rox at 5/10 on the pain scale. Previous/current treatments: steroid injection (last injection 03/09/2021). Current symptoms:Symptoms: same as prior visit; ice and heat improves the pain. Overall, he/she: heis doing better.  I have reviewed the patient's history of present illness as noted/entered above.     I have reviewed the patient's past medical history, surgical history, social history, family history, medications, and allergies as noted in the electronic medical record and as noted/entered.  I have reviewed the patient's review of systems as noted/enter and updated as noted in the patient's HPI.     Left shoulder pain persists the AC joint injection did help - 3/9/2021     Overall he understands surgery will be necessary, discussed surgery vs no-surgery        Shoulder MRI   I personally reviewed the patient's MRI and my personal findings are noted below.     SIDE: LEFT shoulder  Outside hospital MRI, [facility] 3/4/2021    Findings:  Supraspinatus: Full-thickness tearing with 1+ cm retraction  Infraspinatus: Intact  Subscapularis: Intact for official read but does appear to have some superior border at least partial tearing  Teres minor: no obvious tear     Fatty infiltration of the rotator cuff: Negative  Muscle atrophy of the rotator cuff: Negative     Long head of the biceps: Tendinopathy     Labrum: SLAP 2 tear     AC joint: Significant AC joint arthritis     Glenohumeral joint: Intact degenerative labral tearing is noted     Fracture: no obvious fracture     Hill-Sachs lesion: absent      Subjective   Subjective      Review of Systems   Constitutional: Negative.  Negative for chills, fatigue and fever.   HENT: Negative.  Negative for congestion and dental problem.    Eyes: Negative.  Negative for blurred vision.   Respiratory: Negative.  Negative for shortness of breath.    Cardiovascular:  Negative.  Negative for leg swelling.   Gastrointestinal: Negative.  Negative for abdominal pain.   Endocrine: Negative.  Negative for polyuria.   Genitourinary: Negative.  Negative for difficulty urinating.   Musculoskeletal: Positive for arthralgias.   Skin: Negative.    Allergic/Immunologic: Negative.    Neurological: Negative.    Hematological: Negative.  Negative for adenopathy.   Psychiatric/Behavioral: Negative.  Negative for behavioral problems.        Past Medical History:   Past Medical History:   Diagnosis Date   • ADD (attention deficit disorder)    • Diabetes (CMS/HCC)    • Hyperlipidemia        Past Surgical History:   Past Surgical History:   Procedure Laterality Date   • ELBOW FUSION Left        Family History:   Family History   Problem Relation Age of Onset   • Stroke Father        Social History:   Social History     Socioeconomic History   • Marital status:      Spouse name: Not on file   • Number of children: Not on file   • Years of education: Not on file   • Highest education level: Not on file   Tobacco Use   • Smoking status: Never Smoker   • Smokeless tobacco: Never Used   Substance and Sexual Activity   • Drug use: Never       Medications:   Current Outpatient Medications:   •  atomoxetine (STRATTERA) 60 MG capsule, TAKE ONE CAPSULE BY MOUTH DAILY, Disp: 90 capsule, Rfl: 1  •  atorvastatin (LIPITOR) 40 MG tablet, TAKE ONE TABLET BY MOUTH DAILY, Disp: 90 tablet, Rfl: 1  •  Blood Glucose Monitoring Suppl (ONE TOUCH ULTRA 2) w/Device kit, , Disp: , Rfl:   •  clindamycin (CLINDAGEL) 1 % gel, Apply topically to rash twice daily as needed., Disp: 60 g, Rfl: 1  •  Lancets (ONETOUCH DELICA PLUS [id number]) misc, , Disp: , Rfl:   •  nebivolol (Bystolic) 10 MG tablet, Take 1 tablet by mouth Daily., Disp: 30 tablet, Rfl: 0  •  omeprazole (PrilOSEC) 20 MG capsule, Take 1 capsule by mouth Daily., Disp: 90 capsule, Rfl: 3  •  ONETOUCH ULTRA test strip, , Disp: , Rfl:   •  oxyCODONE-acetaminophen  "(PERCOCET) 7.5-325 MG per tablet, Take 1 tablet by mouth Every 6 (Six) Hours As Needed for Moderate Pain  or Severe Pain ., Disp: 12 tablet, Rfl: 0  •  promethazine (PHENERGAN) 25 MG tablet, Take 1 tablet by mouth Every 6 (Six) Hours As Needed for Nausea or Vomiting., Disp: 30 tablet, Rfl: 0  •  sildenafil (VIAGRA) 50 MG tablet, Take 1 tablet by mouth As Needed for Erectile Dysfunction., Disp: 30 tablet, Rfl: 5  •  Synjardy 12.5-1000 MG tablet, TAKE ONE TABLET BY MOUTH TWICE A DAY, Disp: 180 tablet, Rfl: 1    Allergies: No Known Allergies    The following portions of the patient's history were reviewed and updated as appropriate: allergies, current medications, past family history, past medical history, past social history, past surgical history and problem list.        Objective    Objective      Vital Signs:   Vitals:    08/31/21 0758   BP: 118/78   Weight: 106 kg (233 lb 11 oz)   Height: 177.8 cm (70\")       Ortho Exam:  Left AC joint pain, cross body abduction pain tenderness at the AC joint to palpation  Left rotator cuff pain pain with Jobes testing  Mild anterior biceps pain, positive Neer, positive Rouse for impingement testing  Still has good range of motion but painful arc more than 110 degrees    Results Review:  Imaging Results (Last 24 Hours)     ** No results found for the last 24 hours. **        MRI as noted prior    Procedures            Assessment / Plan      Assessment/Plan:   Problem List Items Addressed This Visit        Musculoskeletal and Injuries    Nontraumatic complete tear of left rotator cuff - Primary    Relevant Orders    External Facility Surgical/Procedural Request    Bursitis of left shoulder    Relevant Orders    External Facility Surgical/Procedural Request    Arthritis of left acromioclavicular joint    Relevant Orders    External Facility Surgical/Procedural Request    Superior glenoid labrum lesion of right shoulder    Relevant Orders    External Facility Surgical/Procedural " "Request          Left shoulder pain persist desire to proceed with left shoulder surgery.    Risks and benefits of continued nonoperative management versus surgical management were discussed. The patient desires to proceed with operative intervention.    Rotator cuff repair was discussed with biceps tenodesis and subacromial decompression with acromioplasty. We discussed that sometimes the rotator cuff tear is not repairable and may require debridement or partial repair. The procedure is routinely done arthroscopically, but sometimes a larger incision needs to be made to complete the repair in an \"open\" fashion for rare cases.    Specific risks discussed included pain, bleeding, infection, injury to surrounding nerve and blood vessels, fracture, failure or lack of healing of rotator cuff repair, incomplete pain relief, hardware failure, potential need for additional procedures, stiffness after surgery, possible biceps deformity, and potential inability to restore range of motion and strength. Medical, anesthetic, and block complications were additionally discussed.     General anesthesia is required, sling compliance, and compliance with physical therapy and/or a surgeon guided exercise program will be very important to the recovery process.    He is aware of the ongoing COVID-19 pandemic surge.  His surgery would be performed in an ambulatory surgery center rather than the main hospital.    We also discussed the risks and benefits of postoperative medications including opioid medications for pain control.     LEFT SHOULDER  Rotator cuff tear - Arthroscopic rotator cuff repair CPT code 51790  Biceps tendonitis - Arthroscopic biceps tenodesis CPT code 63751 POSSIBLE  Arthroscopic distal clavicle resection CPT code 38014  Shoulder impingement syndrome Arthroscopic subacromial decompression       Ultrasling III - a neutral rotation sling is recommended for this patient for perioperative care.  The patient was fitted " for the sling and the sling was provided today.  The sling will be a critical part of the perioperative care for these diagnoses.        Follow Up: SURGERY      Rudy Méndez MD, FAAOS  Orthopedic Surgeon  Fellowship Trained Shoulder and Elbow Surgeon  [facility]  Orthopedics and Sports Medicine  [address] [place] [address], [address]  [place] [address]    08/31/21  12:21 EDT    Please note that portions of this note may have been completed with a voice recognition program. Efforts were made to edit the dictations, but occasionally words are mistranscribed.

## 2021-09-01 ENCOUNTER — TELEPHONE (OUTPATIENT)
Dept: ORTHOPEDIC SURGERY | Facility: CLINIC | Age: 41
End: 2021-09-01

## 2021-09-09 DIAGNOSIS — M75.102 NONTRAUMATIC TEAR OF LEFT ROTATOR CUFF, UNSPECIFIED TEAR EXTENT: ICD-10-CM

## 2021-09-09 RX ORDER — OXYCODONE AND ACETAMINOPHEN 7.5; 325 MG/1; MG/1
1 TABLET ORAL EVERY 6 HOURS PRN
Qty: 12 TABLET | Refills: 0 | Status: SHIPPED | OUTPATIENT
Start: 2021-09-09 | End: 2021-10-04 | Stop reason: SDUPTHER

## 2021-10-04 DIAGNOSIS — M75.102 NONTRAUMATIC TEAR OF LEFT ROTATOR CUFF, UNSPECIFIED TEAR EXTENT: ICD-10-CM

## 2021-10-04 RX ORDER — OXYCODONE AND ACETAMINOPHEN 7.5; 325 MG/1; MG/1
1 TABLET ORAL EVERY 6 HOURS PRN
Qty: 12 TABLET | Refills: 0 | Status: SHIPPED | OUTPATIENT
Start: 2021-10-04 | End: 2022-03-11

## 2021-10-28 ENCOUNTER — TELEPHONE (OUTPATIENT)
Dept: ORTHOPEDIC SURGERY | Facility: CLINIC | Age: 41
End: 2021-10-28

## 2021-10-28 DIAGNOSIS — M75.122 NONTRAUMATIC COMPLETE TEAR OF LEFT ROTATOR CUFF: Primary | ICD-10-CM

## 2021-10-28 DIAGNOSIS — M19.012 ARTHRITIS OF LEFT ACROMIOCLAVICULAR JOINT: ICD-10-CM

## 2021-10-28 DIAGNOSIS — M75.42 IMPINGEMENT SYNDROME OF LEFT SHOULDER: ICD-10-CM

## 2021-10-28 DIAGNOSIS — S43.431A SUPERIOR GLENOID LABRUM LESION OF RIGHT SHOULDER, INITIAL ENCOUNTER: ICD-10-CM

## 2021-10-28 DIAGNOSIS — M75.52 BURSITIS OF LEFT SHOULDER: ICD-10-CM

## 2021-10-28 RX ORDER — ONDANSETRON 4 MG/1
4 TABLET, FILM COATED ORAL EVERY 6 HOURS PRN
Qty: 30 TABLET | Refills: 1 | Status: SHIPPED | OUTPATIENT
Start: 2021-10-28 | End: 2021-11-05

## 2021-10-28 RX ORDER — HYDROCODONE BITARTRATE AND ACETAMINOPHEN 10; 325 MG/1; MG/1
1 TABLET ORAL EVERY 4 HOURS PRN
Qty: 42 TABLET | Refills: 0 | Status: SHIPPED | OUTPATIENT
Start: 2021-10-28 | End: 2021-11-04

## 2021-10-28 RX ORDER — METHOCARBAMOL 500 MG/1
500 TABLET, FILM COATED ORAL 3 TIMES DAILY PRN
Qty: 40 TABLET | Refills: 1 | Status: SHIPPED | OUTPATIENT
Start: 2021-10-28 | End: 2021-11-11

## 2021-10-28 NOTE — TELEPHONE ENCOUNTER
Caller: PATIENT    Relationship to patient: SELF    Best call back number:  [phone]      Type of visit: LEFT SHOULDER SURGERY       If rescheduling, when is the original appointment:  11/01/21    Additional notes: PT. STATES THAT HE WANTS TO CANCEL HIS LEFT SHOULDER SURGERY WITH DR. TOVAR FOR 11/1/21.   HE STATES THAT HE WILL PROBABLY HAVE TO WAIT UNTIL NEXT YEAR TO RESCHEDULE

## 2021-10-29 ENCOUNTER — APPOINTMENT (OUTPATIENT)
Dept: PREADMISSION TESTING | Facility: HOSPITAL | Age: 41
End: 2021-10-29

## 2021-11-29 RX ORDER — EMPAGLIFLOZIN AND METFORMIN HYDROCHLORIDE 12.5; 1 MG/1; MG/1
1 TABLET ORAL 2 TIMES DAILY
Qty: 180 TABLET | Refills: 1 | Status: SHIPPED | OUTPATIENT
Start: 2021-11-29 | End: 2021-12-10

## 2021-11-29 RX ORDER — ATORVASTATIN CALCIUM 40 MG/1
40 TABLET, FILM COATED ORAL DAILY
Qty: 90 TABLET | Refills: 1 | Status: SHIPPED | OUTPATIENT
Start: 2021-11-29 | End: 2021-12-10

## 2021-11-29 RX ORDER — ATOMOXETINE 60 MG/1
60 CAPSULE ORAL DAILY
Qty: 90 CAPSULE | Refills: 1 | Status: SHIPPED | OUTPATIENT
Start: 2021-11-29 | End: 2022-04-29 | Stop reason: SDUPTHER

## 2021-11-29 RX ORDER — OMEPRAZOLE 20 MG/1
20 CAPSULE, DELAYED RELEASE ORAL DAILY
Qty: 90 CAPSULE | Refills: 3 | Status: SHIPPED | OUTPATIENT
Start: 2021-11-29 | End: 2022-04-29 | Stop reason: SDUPTHER

## 2021-12-03 DIAGNOSIS — R11.0 NAUSEA: ICD-10-CM

## 2021-12-03 RX ORDER — PROMETHAZINE HYDROCHLORIDE 25 MG/1
25 TABLET ORAL EVERY 6 HOURS PRN
Qty: 30 TABLET | Refills: 0 | Status: SHIPPED | OUTPATIENT
Start: 2021-12-03 | End: 2022-04-04

## 2021-12-10 DIAGNOSIS — I10 ESSENTIAL HYPERTENSION: ICD-10-CM

## 2021-12-10 RX ORDER — EMPAGLIFLOZIN AND METFORMIN HYDROCHLORIDE 12.5; 1 MG/1; MG/1
TABLET ORAL
Qty: 60 TABLET | Refills: 6 | Status: SHIPPED | OUTPATIENT
Start: 2021-12-10 | End: 2022-04-29 | Stop reason: SDUPTHER

## 2021-12-10 RX ORDER — MELOXICAM 15 MG/1
TABLET ORAL
Qty: 30 TABLET | Refills: 6 | Status: SHIPPED | OUTPATIENT
Start: 2021-12-10 | End: 2022-04-29 | Stop reason: SDUPTHER

## 2021-12-10 RX ORDER — ATORVASTATIN CALCIUM 40 MG/1
TABLET, FILM COATED ORAL
Qty: 30 TABLET | Refills: 6 | Status: SHIPPED | OUTPATIENT
Start: 2021-12-10 | End: 2022-04-29 | Stop reason: SDUPTHER

## 2021-12-10 RX ORDER — NEBIVOLOL 10 MG/1
10 TABLET ORAL DAILY
Qty: 90 TABLET | Refills: 3 | Status: SHIPPED | OUTPATIENT
Start: 2021-12-10 | End: 2022-02-28 | Stop reason: DRUGHIGH

## 2022-01-11 RX ORDER — PREDNISONE 1 MG/1
TABLET ORAL
Qty: 21 TABLET | Refills: 0 | Status: SHIPPED | OUTPATIENT
Start: 2022-01-11 | End: 2022-02-24

## 2022-01-11 RX ORDER — DEXTROMETHORPHAN HYDROBROMIDE AND PROMETHAZINE HYDROCHLORIDE 15; 6.25 MG/5ML; MG/5ML
5 SYRUP ORAL 4 TIMES DAILY PRN
Qty: 240 ML | Refills: 0 | Status: SHIPPED | OUTPATIENT
Start: 2022-01-11 | End: 2022-02-24

## 2022-01-11 RX ORDER — AZITHROMYCIN 250 MG/1
TABLET, FILM COATED ORAL
Qty: 6 TABLET | Refills: 0 | Status: SHIPPED | OUTPATIENT
Start: 2022-01-11 | End: 2022-04-04

## 2022-02-25 DIAGNOSIS — I10 ESSENTIAL HYPERTENSION: Primary | ICD-10-CM

## 2022-02-25 DIAGNOSIS — E11.9 TYPE 2 DIABETES MELLITUS WITHOUT COMPLICATION, WITHOUT LONG-TERM CURRENT USE OF INSULIN: ICD-10-CM

## 2022-02-25 LAB — HBA1C MFR BLD: 8.9 %

## 2022-02-25 PROCEDURE — 83036 HEMOGLOBIN GLYCOSYLATED A1C: CPT | Performed by: NURSE PRACTITIONER

## 2022-02-28 DIAGNOSIS — I10 ESSENTIAL HYPERTENSION: ICD-10-CM

## 2022-02-28 RX ORDER — NEBIVOLOL 20 MG/1
20 TABLET ORAL DAILY
Qty: 90 TABLET | Refills: 3 | Status: SHIPPED | OUTPATIENT
Start: 2022-02-28 | End: 2022-04-29 | Stop reason: SDUPTHER

## 2022-03-11 ENCOUNTER — OFFICE VISIT (OUTPATIENT)
Dept: INTERNAL MEDICINE | Facility: CLINIC | Age: 42
End: 2022-03-11

## 2022-03-11 ENCOUNTER — LAB (OUTPATIENT)
Dept: LAB | Facility: HOSPITAL | Age: 42
End: 2022-03-11

## 2022-03-11 DIAGNOSIS — E55.9 VITAMIN D DEFICIENCY: ICD-10-CM

## 2022-03-11 DIAGNOSIS — Z13.21 ENCOUNTER FOR VITAMIN DEFICIENCY SCREENING: ICD-10-CM

## 2022-03-11 DIAGNOSIS — E78.5 HYPERLIPIDEMIA, UNSPECIFIED HYPERLIPIDEMIA TYPE: ICD-10-CM

## 2022-03-11 DIAGNOSIS — Z13.29 THYROID DISORDER SCREENING: ICD-10-CM

## 2022-03-11 DIAGNOSIS — E11.9 TYPE 2 DIABETES MELLITUS WITHOUT COMPLICATION, WITHOUT LONG-TERM CURRENT USE OF INSULIN: ICD-10-CM

## 2022-03-11 DIAGNOSIS — Z00.00 WELLNESS EXAMINATION: Primary | ICD-10-CM

## 2022-03-11 DIAGNOSIS — I10 ESSENTIAL HYPERTENSION: ICD-10-CM

## 2022-03-11 DIAGNOSIS — Z11.59 ENCOUNTER FOR HEPATITIS C SCREENING TEST FOR LOW RISK PATIENT: ICD-10-CM

## 2022-03-11 LAB
25(OH)D3 SERPL-MCNC: 32.1 NG/ML (ref 30–100)
ALBUMIN SERPL-MCNC: 4.6 G/DL (ref 3.5–5.2)
ALBUMIN/GLOB SERPL: 2 G/DL
ALP SERPL-CCNC: 68 U/L (ref 39–117)
ALT SERPL W P-5'-P-CCNC: 41 U/L (ref 1–41)
ANION GAP SERPL CALCULATED.3IONS-SCNC: 10 MMOL/L (ref 5–15)
AST SERPL-CCNC: 23 U/L (ref 1–40)
BILIRUB SERPL-MCNC: 0.8 MG/DL (ref 0–1.2)
BUN SERPL-MCNC: 16 MG/DL (ref 6–20)
BUN/CREAT SERPL: 19.8 (ref 7–25)
CALCIUM SPEC-SCNC: 9.2 MG/DL (ref 8.6–10.5)
CHLORIDE SERPL-SCNC: 101 MMOL/L (ref 98–107)
CHOLEST SERPL-MCNC: 134 MG/DL (ref 0–200)
CO2 SERPL-SCNC: 24 MMOL/L (ref 22–29)
CREAT SERPL-MCNC: 0.81 MG/DL (ref 0.76–1.27)
DEPRECATED RDW RBC AUTO: 39.1 FL (ref 37–54)
EGFRCR SERPLBLD CKD-EPI 2021: 114.3 ML/MIN/1.73
ERYTHROCYTE [DISTWIDTH] IN BLOOD BY AUTOMATED COUNT: 13.2 % (ref 12.3–15.4)
FOLATE SERPL-MCNC: 16 NG/ML (ref 4.78–24.2)
GLOBULIN UR ELPH-MCNC: 2.3 GM/DL
GLUCOSE SERPL-MCNC: 106 MG/DL (ref 65–99)
HCT VFR BLD AUTO: 45.2 % (ref 37.5–51)
HCV AB SER DONR QL: NORMAL
HDLC SERPL-MCNC: 33 MG/DL (ref 40–60)
HGB BLD-MCNC: 15.2 G/DL (ref 13–17.7)
LDLC SERPL CALC-MCNC: 79 MG/DL (ref 0–100)
LDLC/HDLC SERPL: 2.33 {RATIO}
MCH RBC QN AUTO: 27.8 PG (ref 26.6–33)
MCHC RBC AUTO-ENTMCNC: 33.6 G/DL (ref 31.5–35.7)
MCV RBC AUTO: 82.6 FL (ref 79–97)
PLATELET # BLD AUTO: 278 10*3/MM3 (ref 140–450)
PMV BLD AUTO: 10.7 FL (ref 6–12)
POTASSIUM SERPL-SCNC: 4.4 MMOL/L (ref 3.5–5.2)
PROT SERPL-MCNC: 6.9 G/DL (ref 6–8.5)
RBC # BLD AUTO: 5.47 10*6/MM3 (ref 4.14–5.8)
SODIUM SERPL-SCNC: 135 MMOL/L (ref 136–145)
TRIGL SERPL-MCNC: 120 MG/DL (ref 0–150)
TSH SERPL DL<=0.05 MIU/L-ACNC: 1.56 UIU/ML (ref 0.27–4.2)
VIT B12 BLD-MCNC: 348 PG/ML (ref 211–946)
VLDLC SERPL-MCNC: 22 MG/DL (ref 5–40)
WBC NRBC COR # BLD: 7.34 10*3/MM3 (ref 3.4–10.8)

## 2022-03-11 PROCEDURE — 80061 LIPID PANEL: CPT | Performed by: NURSE PRACTITIONER

## 2022-03-11 PROCEDURE — 82306 VITAMIN D 25 HYDROXY: CPT | Performed by: NURSE PRACTITIONER

## 2022-03-11 PROCEDURE — 82746 ASSAY OF FOLIC ACID SERUM: CPT | Performed by: NURSE PRACTITIONER

## 2022-03-11 PROCEDURE — 82607 VITAMIN B-12: CPT | Performed by: NURSE PRACTITIONER

## 2022-03-11 PROCEDURE — 86803 HEPATITIS C AB TEST: CPT | Performed by: NURSE PRACTITIONER

## 2022-03-11 PROCEDURE — 80050 GENERAL HEALTH PANEL: CPT | Performed by: NURSE PRACTITIONER

## 2022-03-11 PROCEDURE — 99396 PREV VISIT EST AGE 40-64: CPT | Performed by: NURSE PRACTITIONER

## 2022-03-11 NOTE — PROGRESS NOTES
Subjective   Chief Complaint   Patient presents with   • Annual Exam   • Depression   • Hypertension        Sunil Aparicio is a 41 y.o. male here today for annual exam. Blood pressure doing well and at goal. Blood sugars have improved with diet and lifestyle changes. Following a heart healthy low cholesterol diabetic diet. Outside A1C was above 9 about a month ago. BG was 120 this morning. Depression, anxiety, and attention deficit are well managed with strattera. Seeing ortho for left shoulder pain. Had joint injection in the past. meloxicam helping with pain.     Overall healthy:  Yes  Regular exercise:  Yes and Physically Active  Diet is well balanced:  Yes  Vitamin Supplement:  Yes  Alcohol intake:  Yes and Mild  Tobacco use:  No  Cardiovascular risk is low:  Low  PSA:  n/a, no FH  :  No urinary issues  Colonoscopy:  Up to date, normal colonoscopy in 2021.   GI:  Normal BM  Regular dental exam:  Up to date  Regular eye exam:  Due and Will self-schedule  Immunizations up to date:  Up to date  Wear a seatbelt regularly:  Yes  Wear sunscreen regularly when outdoors:  Yes    Review of Systems   Constitutional: Negative for activity change, appetite change, chills, diaphoresis, fatigue, fever, unexpected weight gain and unexpected weight loss.   HENT: Negative for ear discharge, ear pain, mouth sores, nosebleeds, sinus pressure, sneezing and sore throat.    Eyes: Negative for pain, discharge and itching.   Respiratory: Negative for cough, chest tightness, shortness of breath and wheezing.    Cardiovascular: Negative for chest pain, palpitations and leg swelling.   Gastrointestinal: Negative for abdominal pain, blood in stool, constipation, diarrhea, nausea and vomiting.   Endocrine: Negative for heat intolerance, polydipsia and polyphagia.   Genitourinary: Negative for dysuria, flank pain, frequency, hematuria and urgency.   Musculoskeletal: Positive for arthralgias, back pain and myalgias. Negative for gait  problem, joint swelling, neck pain and neck stiffness.   Skin: Negative for color change, pallor and rash.   Allergic/Immunologic: Negative for immunocompromised state.   Neurological: Negative for seizures, speech difficulty, weakness and numbness.   Hematological: Negative for adenopathy.   Psychiatric/Behavioral: Negative for agitation, decreased concentration, sleep disturbance, suicidal ideas and depressed mood. The patient is not nervous/anxious.        Past Medical History:   Diagnosis Date   • ADD (attention deficit disorder)    • Diabetes (HCC)    • Hyperlipidemia      Past Surgical History:   Procedure Laterality Date   • ELBOW FUSION Left      Family History   Problem Relation Age of Onset   • Stroke Father      Social History     Tobacco Use   Smoking Status Never Smoker   Smokeless Tobacco Never Used     Social History     Substance and Sexual Activity   Alcohol Use None     No Known Allergies    Current Outpatient Medications on File Prior to Visit   Medication Sig   • atomoxetine (STRATTERA) 60 MG capsule Take 1 capsule by mouth Daily.   • atorvastatin (LIPITOR) 40 MG tablet TAKE ONE TABLET BY MOUTH DAILY   • azithromycin (ZITHROMAX) 250 MG tablet Take 2 tablets the first day, then 1 tablet daily for 4 days.   • Blood Glucose Monitoring Suppl (ONE TOUCH ULTRA 2) w/Device kit    • Lancets (ONETOUCH DELICA PLUS [id number]) misc    • meloxicam (MOBIC) 15 MG tablet TAKE ONE TABLET BY MOUTH DAILY   • nebivolol (BYSTOLIC) 20 MG tablet Take 1 tablet by mouth Daily.   • omeprazole (PrilOSEC) 20 MG capsule Take 1 capsule by mouth Daily.   • ONETOUCH ULTRA test strip    • promethazine (PHENERGAN) 25 MG tablet Take 1 tablet by mouth Every 6 (Six) Hours As Needed for Nausea or Vomiting.   • sildenafil (VIAGRA) 50 MG tablet Take 1 tablet by mouth As Needed for Erectile Dysfunction.   • SITagliptin (Januvia) 100 MG tablet Take 1 tablet by mouth Daily.   • Synjardy 12.5-1000 MG tablet TAKE ONE TABLET BY MOUTH TWICE A  DAY   • [DISCONTINUED] oxyCODONE-acetaminophen (PERCOCET) 7.5-325 MG per tablet Take 1 tablet by mouth Every 6 (Six) Hours As Needed for Moderate Pain  or Severe Pain .     No current facility-administered medications on file prior to visit.       Objective   There were no vitals filed for this visit.  There is no height or weight on file to calculate BMI.    Physical Exam  Vitals reviewed.   Constitutional:       Appearance: Normal appearance. He is well-developed.   HENT:      Head: Normocephalic and atraumatic.      Nose: Nose normal.   Eyes:      General: Lids are normal.      Conjunctiva/sclera: Conjunctivae normal.      Pupils: Pupils are equal, round, and reactive to light.   Neck:      Thyroid: No thyromegaly.      Trachea: Trachea normal.   Cardiovascular:      Rate and Rhythm: Normal rate and regular rhythm.      Heart sounds: Normal heart sounds.   Pulmonary:      Effort: Pulmonary effort is normal. No respiratory distress.      Breath sounds: Normal breath sounds.   Musculoskeletal:      Left shoulder: Tenderness present. Decreased range of motion. Decreased strength.   Skin:     General: Skin is warm and dry.   Neurological:      Mental Status: He is alert and oriented to person, place, and time.      GCS: GCS eye subscore is 4. GCS verbal subscore is 5. GCS motor subscore is 6.   Psychiatric:         Attention and Perception: Attention normal.         Mood and Affect: Mood normal.         Speech: Speech normal.         Behavior: Behavior normal. Behavior is cooperative.         Thought Content: Thought content normal.         Patient's There is no height or weight on file to calculate BMI. indicating that he is obese (BMI >30). Obesity-related health conditions include the following: hypertension, diabetes mellitus, dyslipidemias and osteoarthritis. Obesity is worsening. BMI is is above average; BMI management plan is completed. We discussed portion control and increasing  exercise..        Assessment/Plan     Current Outpatient Medications:   •  atomoxetine (STRATTERA) 60 MG capsule, Take 1 capsule by mouth Daily., Disp: 90 capsule, Rfl: 1  •  atorvastatin (LIPITOR) 40 MG tablet, TAKE ONE TABLET BY MOUTH DAILY, Disp: 30 tablet, Rfl: 6  •  azithromycin (ZITHROMAX) 250 MG tablet, Take 2 tablets the first day, then 1 tablet daily for 4 days., Disp: 6 tablet, Rfl: 0  •  Blood Glucose Monitoring Suppl (ONE TOUCH ULTRA 2) w/Device kit, , Disp: , Rfl:   •  Lancets (ONETOUCH DELICA PLUS [id number]) misc, , Disp: , Rfl:   •  meloxicam (MOBIC) 15 MG tablet, TAKE ONE TABLET BY MOUTH DAILY, Disp: 30 tablet, Rfl: 6  •  nebivolol (BYSTOLIC) 20 MG tablet, Take 1 tablet by mouth Daily., Disp: 90 tablet, Rfl: 3  •  omeprazole (PrilOSEC) 20 MG capsule, Take 1 capsule by mouth Daily., Disp: 90 capsule, Rfl: 3  •  ONETOUCH ULTRA test strip, , Disp: , Rfl:   •  promethazine (PHENERGAN) 25 MG tablet, Take 1 tablet by mouth Every 6 (Six) Hours As Needed for Nausea or Vomiting., Disp: 30 tablet, Rfl: 0  •  sildenafil (VIAGRA) 50 MG tablet, Take 1 tablet by mouth As Needed for Erectile Dysfunction., Disp: 30 tablet, Rfl: 5  •  SITagliptin (Januvia) 100 MG tablet, Take 1 tablet by mouth Daily., Disp: 30 tablet, Rfl: 5  •  Synjardy 12.5-1000 MG tablet, TAKE ONE TABLET BY MOUTH TWICE A DAY, Disp: 60 tablet, Rfl: 6    Problem List Items Addressed This Visit        Cardiac and Vasculature    Hyperlipidemia    Overview     Chronic stable requiring medication management, lifestyle changes, and monitoring. Discussed how this being unstable increases risk of cardiovascular disease and adverse events. Will follow a [facility] healthy diet low in cholesterol and fat. Discussed increasing fiber intake. Suggested fish oil or omega 3 supplement of 1200mg twice daily. Educated on how these help lower triglycerides and LDL. Will drink adequate water and increase physical activity as tolerated. Discussed importance of medication  compliance.   Continue Lipitor.           Relevant Orders    Lipid Panel    Essential hypertension  Chronic issue stable requiring medication management and monitoring. Will eat well balanced heart healthy diet, drink adequate water, increase physical activity, and get adequate rest. Monitor blood pressures daily and contact office for any readings consistently above 140/90. Patient will report any associated symptoms such as headaches, blurry vision, or nausea. Patient will go to ER for any chest pressure or chest pain.   Continue lisinopril and bystolic      Relevant Orders    CBC (No Diff)    Comprehensive Metabolic Panel    Lipid Panel       Endocrine and Metabolic    Type 2 diabetes mellitus without complication, without long-term current use of insulin (HCC)    Overview     Chronic issue unstable requiring medication management and monitoring. Will eat well balanced heart healthy diabetic diet, drink adequate water, increase physical activity, and get adequate rest. Will monitor blood sugars daily and keep log for next appt. Will contact office earlier if blood sugars are averaging above 250.   Continue synjardy and januvia. Work on diet and exercise           Relevant Orders    CBC (No Diff)    Comprehensive Metabolic Panel      Other Visit Diagnoses     Wellness examination    -  Primary  Patient will continue to eat a well-balanced diet, drink adequate amount of water, exercise at least 3 times weekly, and get adequate rest.  Suggested a multivitamin daily.  Discussed future preventative screenings and immunizations.      Relevant Orders    CBC (No Diff)    Comprehensive Metabolic Panel    Lipid Panel    TSH Rfx On Abnormal To Free T4    Vitamin B12 & Folate    Vitamin D 25 Hydroxy    Hepatitis C Antibody    Vitamin D deficiency        Relevant Orders    Vitamin D 25 Hydroxy    Thyroid disorder screening        Relevant Orders    TSH Rfx On Abnormal To Free T4    Encounter for vitamin deficiency screening         Relevant Orders    Vitamin B12 & Folate    Vitamin D 25 Hydroxy    Encounter for hepatitis C screening test for low risk patient        Relevant Orders    Hepatitis C Antibody                Counseling was given to patient for the following topics: appropriate exercise, healthy eating habits, disease prevention, risk factors for cancer, importance of self testicular exam, importance of immunizations, including risks and benefits, bone health, sun safety and seatbelt use.     Plan of care reviewed with the patient at the conclusion of today's visit.  Education was provided regarding diagnosis, management, and any prescribed or recommended OTC medications.  Patient verbalized understanding of and agreement with management plan.     Return if symptoms worsen or fail to improve.      Lilibeth Lopez, APRN

## 2022-04-04 RX ORDER — AMOXICILLIN AND CLAVULANATE POTASSIUM 875; 125 MG/1; MG/1
1 TABLET, FILM COATED ORAL 2 TIMES DAILY
Qty: 14 TABLET | Refills: 0 | Status: CANCELLED | OUTPATIENT
Start: 2022-04-04

## 2022-04-05 RX ORDER — AMOXICILLIN AND CLAVULANATE POTASSIUM 875; 125 MG/1; MG/1
1 TABLET, FILM COATED ORAL 2 TIMES DAILY
Qty: 20 TABLET | Refills: 0 | Status: SHIPPED | OUTPATIENT
Start: 2022-04-05 | End: 2022-04-15

## 2022-04-07 RX ORDER — METHYLPREDNISOLONE 4 MG/1
TABLET ORAL
Qty: 21 TABLET | Refills: 0 | Status: SHIPPED | OUTPATIENT
Start: 2022-04-07 | End: 2022-10-28

## 2022-04-29 RX ORDER — ATORVASTATIN CALCIUM 40 MG/1
40 TABLET, FILM COATED ORAL DAILY
Qty: 90 TABLET | Refills: 2 | Status: SHIPPED | OUTPATIENT
Start: 2022-04-29 | End: 2022-05-06 | Stop reason: SDUPTHER

## 2022-04-29 RX ORDER — EMPAGLIFLOZIN AND METFORMIN HYDROCHLORIDE 12.5; 1 MG/1; MG/1
1 TABLET ORAL 2 TIMES DAILY
Qty: 180 TABLET | Refills: 2 | Status: SHIPPED | OUTPATIENT
Start: 2022-04-29 | End: 2022-05-06 | Stop reason: SDUPTHER

## 2022-04-29 RX ORDER — OMEPRAZOLE 20 MG/1
20 CAPSULE, DELAYED RELEASE ORAL DAILY
Qty: 90 CAPSULE | Refills: 2 | Status: SHIPPED | OUTPATIENT
Start: 2022-04-29 | End: 2022-05-06 | Stop reason: SDUPTHER

## 2022-04-29 RX ORDER — ATOMOXETINE 60 MG/1
60 CAPSULE ORAL DAILY
Qty: 90 CAPSULE | Refills: 2 | Status: SHIPPED | OUTPATIENT
Start: 2022-04-29 | End: 2022-05-06 | Stop reason: SDUPTHER

## 2022-04-29 RX ORDER — NEBIVOLOL 20 MG/1
20 TABLET ORAL DAILY
Qty: 90 TABLET | Refills: 2 | Status: SHIPPED | OUTPATIENT
Start: 2022-04-29 | End: 2022-05-06 | Stop reason: SDUPTHER

## 2022-04-29 RX ORDER — MELOXICAM 15 MG/1
15 TABLET ORAL DAILY
Qty: 90 TABLET | Refills: 2 | Status: SHIPPED | OUTPATIENT
Start: 2022-04-29 | End: 2022-05-06 | Stop reason: SDUPTHER

## 2022-05-06 RX ORDER — ATORVASTATIN CALCIUM 40 MG/1
40 TABLET, FILM COATED ORAL DAILY
Qty: 30 TABLET | Refills: 5 | Status: SHIPPED | OUTPATIENT
Start: 2022-05-06 | End: 2022-10-31 | Stop reason: SDUPTHER

## 2022-05-06 RX ORDER — OMEPRAZOLE 20 MG/1
20 CAPSULE, DELAYED RELEASE ORAL DAILY
Qty: 30 CAPSULE | Refills: 5 | Status: SHIPPED | OUTPATIENT
Start: 2022-05-06 | End: 2022-10-31 | Stop reason: SDUPTHER

## 2022-05-06 RX ORDER — NEBIVOLOL 20 MG/1
20 TABLET ORAL DAILY
Qty: 90 TABLET | Refills: 5 | Status: SHIPPED | OUTPATIENT
Start: 2022-05-06 | End: 2022-10-31 | Stop reason: SDUPTHER

## 2022-05-06 RX ORDER — ATOMOXETINE 60 MG/1
60 CAPSULE ORAL DAILY
Qty: 30 CAPSULE | Refills: 5 | Status: SHIPPED | OUTPATIENT
Start: 2022-05-06 | End: 2022-06-16

## 2022-05-06 RX ORDER — MELOXICAM 15 MG/1
15 TABLET ORAL DAILY
Qty: 30 TABLET | Refills: 5 | Status: SHIPPED | OUTPATIENT
Start: 2022-05-06 | End: 2022-10-20 | Stop reason: CLARIF

## 2022-05-06 RX ORDER — EMPAGLIFLOZIN AND METFORMIN HYDROCHLORIDE 12.5; 1 MG/1; MG/1
1 TABLET ORAL 2 TIMES DAILY
Qty: 60 TABLET | Refills: 5 | Status: SHIPPED | OUTPATIENT
Start: 2022-05-06 | End: 2022-10-28

## 2022-06-02 RX ORDER — BLOOD SUGAR DIAGNOSTIC
1 STRIP MISCELLANEOUS AS NEEDED
Qty: 100 EACH | Refills: 5 | Status: SHIPPED | OUTPATIENT
Start: 2022-06-02

## 2022-06-09 DIAGNOSIS — M25.512 CHRONIC LEFT SHOULDER PAIN: Primary | ICD-10-CM

## 2022-06-09 DIAGNOSIS — G89.29 CHRONIC LEFT SHOULDER PAIN: Primary | ICD-10-CM

## 2022-06-09 RX ORDER — OXYCODONE AND ACETAMINOPHEN 7.5; 325 MG/1; MG/1
1 TABLET ORAL EVERY 6 HOURS PRN
Qty: 12 TABLET | Refills: 0 | Status: SHIPPED | OUTPATIENT
Start: 2022-06-09 | End: 2022-10-28

## 2022-06-16 RX ORDER — ATOMOXETINE 60 MG/1
CAPSULE ORAL
Qty: 30 CAPSULE | Refills: 5 | Status: SHIPPED | OUTPATIENT
Start: 2022-06-16 | End: 2022-10-31 | Stop reason: SDUPTHER

## 2022-06-22 RX ORDER — AZITHROMYCIN 250 MG/1
TABLET, FILM COATED ORAL
Qty: 6 TABLET | Refills: 0 | Status: SHIPPED | OUTPATIENT
Start: 2022-06-22 | End: 2022-10-28

## 2022-10-20 RX ORDER — CELECOXIB 200 MG/1
200 CAPSULE ORAL DAILY
Qty: 30 CAPSULE | Refills: 3 | Status: SHIPPED | OUTPATIENT
Start: 2022-10-20 | End: 2022-11-18 | Stop reason: SDUPTHER

## 2022-10-28 ENCOUNTER — OFFICE VISIT (OUTPATIENT)
Dept: INTERNAL MEDICINE | Facility: CLINIC | Age: 42
End: 2022-10-28

## 2022-10-28 VITALS
OXYGEN SATURATION: 98 % | BODY MASS INDEX: 32.64 KG/M2 | HEART RATE: 66 BPM | RESPIRATION RATE: 16 BRPM | WEIGHT: 228 LBS | DIASTOLIC BLOOD PRESSURE: 82 MMHG | TEMPERATURE: 98.1 F | SYSTOLIC BLOOD PRESSURE: 120 MMHG | HEIGHT: 70 IN

## 2022-10-28 DIAGNOSIS — E78.2 MIXED HYPERLIPIDEMIA: ICD-10-CM

## 2022-10-28 DIAGNOSIS — E11.65 TYPE 2 DIABETES MELLITUS WITH HYPERGLYCEMIA, WITHOUT LONG-TERM CURRENT USE OF INSULIN: Primary | ICD-10-CM

## 2022-10-28 DIAGNOSIS — I10 ESSENTIAL HYPERTENSION: ICD-10-CM

## 2022-10-28 DIAGNOSIS — M25.551 RIGHT HIP PAIN: ICD-10-CM

## 2022-10-28 DIAGNOSIS — F98.8 ATTENTION DEFICIT DISORDER (ADD) WITHOUT HYPERACTIVITY: ICD-10-CM

## 2022-10-28 LAB — HBA1C MFR BLD: 8.1 %

## 2022-10-28 PROCEDURE — 99214 OFFICE O/P EST MOD 30 MIN: CPT | Performed by: NURSE PRACTITIONER

## 2022-10-28 PROCEDURE — 83036 HEMOGLOBIN GLYCOSYLATED A1C: CPT | Performed by: NURSE PRACTITIONER

## 2022-10-28 RX ORDER — GLIPIZIDE 5 MG/1
5 TABLET ORAL
Qty: 60 TABLET | Refills: 5 | Status: SHIPPED | OUTPATIENT
Start: 2022-10-28

## 2022-10-28 RX ORDER — EMPAGLIFLOZIN AND METFORMIN HYDROCHLORIDE 12.5; 1 MG/1; MG/1
1 TABLET ORAL 2 TIMES DAILY
Qty: 60 TABLET | Refills: 5 | Status: SHIPPED | OUTPATIENT
Start: 2022-10-28 | End: 2022-11-18 | Stop reason: SDUPTHER

## 2022-10-31 RX ORDER — ATOMOXETINE 60 MG/1
60 CAPSULE ORAL DAILY
Qty: 90 CAPSULE | Refills: 5 | Status: SHIPPED | OUTPATIENT
Start: 2022-10-31 | End: 2022-11-18 | Stop reason: SDUPTHER

## 2022-10-31 RX ORDER — ATORVASTATIN CALCIUM 40 MG/1
40 TABLET, FILM COATED ORAL DAILY
Qty: 90 TABLET | Refills: 5 | Status: SHIPPED | OUTPATIENT
Start: 2022-10-31 | End: 2022-12-13

## 2022-10-31 RX ORDER — NEBIVOLOL 20 MG/1
20 TABLET ORAL DAILY
Qty: 90 TABLET | Refills: 5 | Status: SHIPPED | OUTPATIENT
Start: 2022-10-31 | End: 2023-03-02

## 2022-10-31 RX ORDER — OMEPRAZOLE 20 MG/1
20 CAPSULE, DELAYED RELEASE ORAL DAILY
Qty: 90 CAPSULE | Refills: 5 | Status: SHIPPED | OUTPATIENT
Start: 2022-10-31 | End: 2022-11-18 | Stop reason: SDUPTHER

## 2022-11-11 ENCOUNTER — LAB (OUTPATIENT)
Dept: LAB | Facility: HOSPITAL | Age: 42
End: 2022-11-11

## 2022-11-11 DIAGNOSIS — I10 ESSENTIAL HYPERTENSION: ICD-10-CM

## 2022-11-11 DIAGNOSIS — E78.2 MIXED HYPERLIPIDEMIA: ICD-10-CM

## 2022-11-11 DIAGNOSIS — E11.65 TYPE 2 DIABETES MELLITUS WITH HYPERGLYCEMIA, WITHOUT LONG-TERM CURRENT USE OF INSULIN: ICD-10-CM

## 2022-11-11 LAB
ALBUMIN SERPL-MCNC: 4.6 G/DL (ref 3.5–5.2)
ALBUMIN/GLOB SERPL: 2 G/DL
ALP SERPL-CCNC: 74 U/L (ref 39–117)
ALT SERPL W P-5'-P-CCNC: 38 U/L (ref 1–41)
ANION GAP SERPL CALCULATED.3IONS-SCNC: 15.6 MMOL/L (ref 5–15)
AST SERPL-CCNC: 22 U/L (ref 1–40)
BILIRUB SERPL-MCNC: 0.8 MG/DL (ref 0–1.2)
BUN SERPL-MCNC: 14 MG/DL (ref 6–20)
BUN/CREAT SERPL: 14.9 (ref 7–25)
CALCIUM SPEC-SCNC: 9.5 MG/DL (ref 8.6–10.5)
CHLORIDE SERPL-SCNC: 102 MMOL/L (ref 98–107)
CHOLEST SERPL-MCNC: 151 MG/DL (ref 0–200)
CO2 SERPL-SCNC: 22.4 MMOL/L (ref 22–29)
CREAT SERPL-MCNC: 0.94 MG/DL (ref 0.76–1.27)
EGFRCR SERPLBLD CKD-EPI 2021: 105.1 ML/MIN/1.73
GLOBULIN UR ELPH-MCNC: 2.3 GM/DL
GLUCOSE SERPL-MCNC: 156 MG/DL (ref 65–99)
HDLC SERPL-MCNC: 36 MG/DL (ref 40–60)
LDLC SERPL CALC-MCNC: 91 MG/DL (ref 0–100)
LDLC/HDLC SERPL: 2.47 {RATIO}
POTASSIUM SERPL-SCNC: 3.9 MMOL/L (ref 3.5–5.2)
PROT SERPL-MCNC: 6.9 G/DL (ref 6–8.5)
SODIUM SERPL-SCNC: 140 MMOL/L (ref 136–145)
TRIGL SERPL-MCNC: 131 MG/DL (ref 0–150)
VLDLC SERPL-MCNC: 24 MG/DL (ref 5–40)

## 2022-11-11 PROCEDURE — 80053 COMPREHEN METABOLIC PANEL: CPT

## 2022-11-11 PROCEDURE — 80061 LIPID PANEL: CPT

## 2022-11-18 DIAGNOSIS — E11.65 TYPE 2 DIABETES MELLITUS WITH HYPERGLYCEMIA, WITHOUT LONG-TERM CURRENT USE OF INSULIN: ICD-10-CM

## 2022-11-18 RX ORDER — EMPAGLIFLOZIN AND METFORMIN HYDROCHLORIDE 12.5; 1 MG/1; MG/1
1 TABLET ORAL 2 TIMES DAILY
Qty: 180 TABLET | Refills: 3 | Status: SHIPPED | OUTPATIENT
Start: 2022-11-18 | End: 2022-12-13

## 2022-11-18 RX ORDER — CELECOXIB 200 MG/1
200 CAPSULE ORAL DAILY
Qty: 90 CAPSULE | Refills: 3 | Status: SHIPPED | OUTPATIENT
Start: 2022-11-18 | End: 2023-01-05 | Stop reason: SDUPTHER

## 2022-11-18 RX ORDER — OMEPRAZOLE 20 MG/1
20 CAPSULE, DELAYED RELEASE ORAL DAILY
Qty: 90 CAPSULE | Refills: 5 | Status: SHIPPED | OUTPATIENT
Start: 2022-11-18 | End: 2023-01-05 | Stop reason: SDUPTHER

## 2022-11-18 RX ORDER — ATOMOXETINE 60 MG/1
60 CAPSULE ORAL DAILY
Qty: 90 CAPSULE | Refills: 5 | Status: SHIPPED | OUTPATIENT
Start: 2022-11-18 | End: 2022-12-13

## 2022-12-13 DIAGNOSIS — E11.65 TYPE 2 DIABETES MELLITUS WITH HYPERGLYCEMIA, WITHOUT LONG-TERM CURRENT USE OF INSULIN: ICD-10-CM

## 2022-12-13 RX ORDER — EMPAGLIFLOZIN AND METFORMIN HYDROCHLORIDE 12.5; 1 MG/1; MG/1
TABLET ORAL
Qty: 60 TABLET | Refills: 3 | Status: SHIPPED | OUTPATIENT
Start: 2022-12-13

## 2022-12-13 RX ORDER — ATORVASTATIN CALCIUM 40 MG/1
TABLET, FILM COATED ORAL
Qty: 90 TABLET | Refills: 3 | Status: SHIPPED | OUTPATIENT
Start: 2022-12-13

## 2022-12-13 RX ORDER — ATOMOXETINE 60 MG/1
CAPSULE ORAL
Qty: 90 CAPSULE | Refills: 3 | Status: SHIPPED | OUTPATIENT
Start: 2022-12-13

## 2023-01-05 RX ORDER — OMEPRAZOLE 20 MG/1
20 CAPSULE, DELAYED RELEASE ORAL DAILY
Qty: 90 CAPSULE | Refills: 2 | Status: SHIPPED | OUTPATIENT
Start: 2023-01-05

## 2023-01-05 RX ORDER — CELECOXIB 200 MG/1
200 CAPSULE ORAL DAILY
Qty: 90 CAPSULE | Refills: 2 | Status: SHIPPED | OUTPATIENT
Start: 2023-01-05

## 2023-03-02 RX ORDER — NEBIVOLOL 20 MG/1
TABLET ORAL
Qty: 90 TABLET | Refills: 2 | Status: SHIPPED | OUTPATIENT
Start: 2023-03-02

## 2023-03-17 DIAGNOSIS — G89.29 CHRONIC LEFT SHOULDER PAIN: Primary | ICD-10-CM

## 2023-03-17 DIAGNOSIS — M25.512 CHRONIC LEFT SHOULDER PAIN: Primary | ICD-10-CM

## 2023-03-17 RX ORDER — TRAMADOL HYDROCHLORIDE 50 MG/1
50 TABLET ORAL EVERY 6 HOURS PRN
Qty: 60 TABLET | Refills: 0 | Status: SHIPPED | OUTPATIENT
Start: 2023-03-17

## 2023-03-17 RX ORDER — CYCLOBENZAPRINE HCL 10 MG
10 TABLET ORAL 3 TIMES DAILY PRN
Qty: 90 TABLET | Refills: 0 | Status: SHIPPED | OUTPATIENT
Start: 2023-03-17

## 2023-03-27 RX ORDER — SILDENAFIL 50 MG/1
50 TABLET, FILM COATED ORAL AS NEEDED
Qty: 30 TABLET | Refills: 5 | Status: SHIPPED | OUTPATIENT
Start: 2023-03-27

## 2023-04-24 DIAGNOSIS — E11.65 TYPE 2 DIABETES MELLITUS WITH HYPERGLYCEMIA, WITHOUT LONG-TERM CURRENT USE OF INSULIN: Primary | ICD-10-CM

## 2023-04-24 LAB — HBA1C MFR BLD: 7.7 %

## 2023-05-05 ENCOUNTER — OFFICE VISIT (OUTPATIENT)
Dept: INTERNAL MEDICINE | Facility: CLINIC | Age: 43
End: 2023-05-05
Payer: COMMERCIAL

## 2023-05-05 ENCOUNTER — LAB (OUTPATIENT)
Dept: LAB | Facility: HOSPITAL | Age: 43
End: 2023-05-05
Payer: COMMERCIAL

## 2023-05-05 VITALS
OXYGEN SATURATION: 98 % | BODY MASS INDEX: 32.78 KG/M2 | HEART RATE: 68 BPM | TEMPERATURE: 98.4 F | DIASTOLIC BLOOD PRESSURE: 80 MMHG | WEIGHT: 229 LBS | RESPIRATION RATE: 16 BRPM | SYSTOLIC BLOOD PRESSURE: 132 MMHG | HEIGHT: 70 IN

## 2023-05-05 DIAGNOSIS — Z13.0 SCREENING FOR BLOOD DISEASE: ICD-10-CM

## 2023-05-05 DIAGNOSIS — E55.9 VITAMIN D DEFICIENCY: ICD-10-CM

## 2023-05-05 DIAGNOSIS — F98.8 ATTENTION DEFICIT DISORDER (ADD) WITHOUT HYPERACTIVITY: ICD-10-CM

## 2023-05-05 DIAGNOSIS — E53.9 VITAMIN B DEFICIENCY: ICD-10-CM

## 2023-05-05 DIAGNOSIS — E78.2 MIXED HYPERLIPIDEMIA: ICD-10-CM

## 2023-05-05 DIAGNOSIS — I10 ESSENTIAL HYPERTENSION: ICD-10-CM

## 2023-05-05 DIAGNOSIS — E11.65 TYPE 2 DIABETES MELLITUS WITH HYPERGLYCEMIA, WITHOUT LONG-TERM CURRENT USE OF INSULIN: Primary | ICD-10-CM

## 2023-05-05 DIAGNOSIS — Z13.29 THYROID DISORDER SCREENING: ICD-10-CM

## 2023-05-05 DIAGNOSIS — Z13.21 ENCOUNTER FOR VITAMIN DEFICIENCY SCREENING: ICD-10-CM

## 2023-05-05 LAB
25(OH)D3 SERPL-MCNC: 40.8 NG/ML (ref 30–100)
ALBUMIN SERPL-MCNC: 4.5 G/DL (ref 3.5–5.2)
ALBUMIN UR-MCNC: <1.2 MG/DL
ALBUMIN/GLOB SERPL: 1.9 G/DL
ALP SERPL-CCNC: 78 U/L (ref 39–117)
ALT SERPL W P-5'-P-CCNC: 38 U/L (ref 1–41)
ANION GAP SERPL CALCULATED.3IONS-SCNC: 9 MMOL/L (ref 5–15)
AST SERPL-CCNC: 18 U/L (ref 1–40)
BILIRUB SERPL-MCNC: 0.6 MG/DL (ref 0–1.2)
BUN SERPL-MCNC: 10 MG/DL (ref 6–20)
BUN/CREAT SERPL: 11.4 (ref 7–25)
CALCIUM SPEC-SCNC: 9.5 MG/DL (ref 8.6–10.5)
CHLORIDE SERPL-SCNC: 105 MMOL/L (ref 98–107)
CHOLEST SERPL-MCNC: 148 MG/DL (ref 0–200)
CO2 SERPL-SCNC: 25 MMOL/L (ref 22–29)
CREAT SERPL-MCNC: 0.88 MG/DL (ref 0.76–1.27)
CREAT UR-MCNC: 73 MG/DL
DEPRECATED RDW RBC AUTO: 38.5 FL (ref 37–54)
EGFRCR SERPLBLD CKD-EPI 2021: 110.8 ML/MIN/1.73
ERYTHROCYTE [DISTWIDTH] IN BLOOD BY AUTOMATED COUNT: 13.1 % (ref 12.3–15.4)
FOLATE SERPL-MCNC: >20 NG/ML (ref 4.78–24.2)
GLOBULIN UR ELPH-MCNC: 2.4 GM/DL
GLUCOSE SERPL-MCNC: 139 MG/DL (ref 65–99)
HCT VFR BLD AUTO: 44.3 % (ref 37.5–51)
HDLC SERPL-MCNC: 38 MG/DL (ref 40–60)
HGB BLD-MCNC: 15 G/DL (ref 13–17.7)
LDLC SERPL CALC-MCNC: 94 MG/DL (ref 0–100)
LDLC/HDLC SERPL: 2.46 {RATIO}
MCH RBC QN AUTO: 27.5 PG (ref 26.6–33)
MCHC RBC AUTO-ENTMCNC: 33.9 G/DL (ref 31.5–35.7)
MCV RBC AUTO: 81.3 FL (ref 79–97)
MICROALBUMIN/CREAT UR: NORMAL MG/G{CREAT}
PLATELET # BLD AUTO: 261 10*3/MM3 (ref 140–450)
PMV BLD AUTO: 10.7 FL (ref 6–12)
POTASSIUM SERPL-SCNC: 4.4 MMOL/L (ref 3.5–5.2)
PROT SERPL-MCNC: 6.9 G/DL (ref 6–8.5)
RBC # BLD AUTO: 5.45 10*6/MM3 (ref 4.14–5.8)
SODIUM SERPL-SCNC: 139 MMOL/L (ref 136–145)
TRIGL SERPL-MCNC: 82 MG/DL (ref 0–150)
TSH SERPL DL<=0.05 MIU/L-ACNC: 1.82 UIU/ML (ref 0.27–4.2)
VIT B12 BLD-MCNC: 434 PG/ML (ref 211–946)
VLDLC SERPL-MCNC: 16 MG/DL (ref 5–40)
WBC NRBC COR # BLD: 7.48 10*3/MM3 (ref 3.4–10.8)

## 2023-05-05 PROCEDURE — 80061 LIPID PANEL: CPT | Performed by: NURSE PRACTITIONER

## 2023-05-05 PROCEDURE — 99214 OFFICE O/P EST MOD 30 MIN: CPT | Performed by: NURSE PRACTITIONER

## 2023-05-05 PROCEDURE — 82570 ASSAY OF URINE CREATININE: CPT | Performed by: NURSE PRACTITIONER

## 2023-05-05 PROCEDURE — 80050 GENERAL HEALTH PANEL: CPT | Performed by: NURSE PRACTITIONER

## 2023-05-05 PROCEDURE — 82607 VITAMIN B-12: CPT | Performed by: NURSE PRACTITIONER

## 2023-05-05 PROCEDURE — 82306 VITAMIN D 25 HYDROXY: CPT | Performed by: NURSE PRACTITIONER

## 2023-05-05 PROCEDURE — 82043 UR ALBUMIN QUANTITATIVE: CPT | Performed by: NURSE PRACTITIONER

## 2023-05-05 PROCEDURE — 82746 ASSAY OF FOLIC ACID SERUM: CPT | Performed by: NURSE PRACTITIONER

## 2023-05-05 NOTE — PROGRESS NOTES
Subjective   Chief Complaint   Patient presents with   • Diabetes   • Hyperlipidemia             Sunil Aparicio is a 42 y.o. male.     The patient is here today for follow-up on diabetes and attention deficit disorder.    The patient denies taking Celebrex. He admits to pain that comes and goes (laterality not specified). He took an ibuprofen last night on 5/4/2023. The patient reports that he has not followed up with Dr. Rudy Méndez. He admits that his right shoulder is worse than his left shoulder.     The patient reports Strattera works well for him.    The patient admits to being sick a few weeks ago. He believes it was a virus that lasted for 1 week.     The patient's A1c is 7.7 percent today on 5/5/2023. His last lipid panel 1 year ago was normal. His blood pressure is stable and at goal today on 5/5/2023.    I have reviewed the following portions of the patient's history and confirmed they are accurate: allergies, current medications, past family history, past medical history, past social history, past surgical history, and problem list    I have personally completed the patient's review of systems.    Review of Systems   Constitutional: Negative for activity change, appetite change, chills, diaphoresis, fatigue, fever, unexpected weight gain and unexpected weight loss.   HENT: Negative for ear discharge, ear pain, mouth sores, nosebleeds, sinus pressure, sneezing and sore throat.    Eyes: Negative for pain, discharge and itching.   Respiratory: Negative for cough, chest tightness, shortness of breath and wheezing.    Cardiovascular: Negative for chest pain, palpitations and leg swelling.   Gastrointestinal: Negative for abdominal pain, blood in stool, constipation, diarrhea, nausea and vomiting.   Endocrine: Negative for heat intolerance, polydipsia and polyphagia.   Genitourinary: Negative for dysuria, flank pain, frequency, hematuria and urgency.   Musculoskeletal: Positive for arthralgias, back pain and  myalgias. Negative for gait problem, joint swelling, neck pain and neck stiffness.   Skin: Negative for color change, pallor and rash.   Allergic/Immunologic: Negative for immunocompromised state.   Neurological: Negative for seizures, speech difficulty, weakness and numbness.   Hematological: Negative for adenopathy.   Psychiatric/Behavioral: Negative for agitation, decreased concentration, sleep disturbance, suicidal ideas and depressed mood. The patient is not nervous/anxious.        Current Outpatient Medications on File Prior to Visit   Medication Sig   • atomoxetine (STRATTERA) 60 MG capsule TAKE ONE CAPSULE BY MOUTH DAILY   • atorvastatin (LIPITOR) 40 MG tablet TAKE ONE TABLET BY MOUTH DAILY   • Blood Glucose Monitoring Suppl (ONE TOUCH ULTRA 2) w/Device kit    • Lancets (ONETOUCH DELICA PLUS [id number]) misc    • nebivolol (BYSTOLIC) 20 MG tablet TAKE ONE TABLET BY MOUTH DAILY   • omeprazole (priLOSEC) 20 MG capsule Take 1 capsule by mouth Daily.   • OneTouch Ultra test strip 1 each by Other route As Needed (diabetes).   • sildenafil (VIAGRA) 50 MG tablet Take 1 tablet by mouth As Needed for Erectile Dysfunction.   • SITagliptin (Januvia) 100 MG tablet Take 1 tablet by mouth Daily.   • Synjardy 12.5-1000 MG tablet TAKE ONE TABLET BY MOUTH TWICE A DAY   • [DISCONTINUED] glipizide (GLUCOTROL) 5 MG tablet Take 1 tablet by mouth 2 (Two) Times a Day Before Meals.   • [DISCONTINUED] celecoxib (CeleBREX) 200 MG capsule Take 1 capsule by mouth Daily. (Patient not taking: Reported on 5/5/2023)   • [DISCONTINUED] cyclobenzaprine (FLEXERIL) 10 MG tablet Take 1 tablet by mouth 3 (Three) Times a Day As Needed for Muscle Spasms. (Patient not taking: Reported on 5/5/2023)   • [DISCONTINUED] traMADol (ULTRAM) 50 MG tablet Take 1 tablet by mouth Every 6 (Six) Hours As Needed for Moderate Pain. (Patient not taking: Reported on 5/5/2023)     No current facility-administered medications on file prior to visit.       Objective  "  Vitals:    05/05/23 0742   BP: 132/80   Pulse: 68   Resp: 16   Temp: 98.4 °F (36.9 °C)   TempSrc: Tympanic   SpO2: 98%   Weight: 104 kg (229 lb)   Height: 177.8 cm (70\")     Body mass index is 32.86 kg/m².    Physical Exam  Vitals reviewed.   Constitutional:       Appearance: Normal appearance. He is well-developed.   HENT:      Head: Normocephalic and atraumatic.      Nose: Nose normal.   Eyes:      General: Lids are normal.      Conjunctiva/sclera: Conjunctivae normal.      Pupils: Pupils are equal, round, and reactive to light.   Neck:      Thyroid: No thyromegaly.      Trachea: Trachea normal.   Cardiovascular:      Rate and Rhythm: Normal rate and regular rhythm.      Heart sounds: Normal heart sounds.   Pulmonary:      Effort: Pulmonary effort is normal. No respiratory distress.      Breath sounds: Normal breath sounds.   Musculoskeletal:      Right shoulder: Tenderness present.      Left shoulder: Tenderness present. Decreased range of motion.      Right knee: Tenderness present over the lateral joint line.   Skin:     General: Skin is warm and dry.   Neurological:      Mental Status: He is alert and oriented to person, place, and time.      GCS: GCS eye subscore is 4. GCS verbal subscore is 5. GCS motor subscore is 6.   Psychiatric:         Attention and Perception: Attention normal.         Mood and Affect: Mood normal.         Speech: Speech normal.         Behavior: Behavior normal. Behavior is cooperative.         Thought Content: Thought content normal.         Assessment & Plan   Problem List Items Addressed This Visit        Cardiac and Vasculature    Hyperlipidemia    Relevant Orders    Lipid Panel    Essential hypertension    Relevant Orders    CBC (No Diff)    Comprehensive Metabolic Panel    Lipid Panel       Mental Health    Attention deficit disorder (ADD) without hyperactivity   Other Visit Diagnoses     Type 2 diabetes mellitus with hyperglycemia, without long-term current use of insulin    " -  Primary    Relevant Orders    Microalbumin / Creatinine Urine Ratio - Urine, Clean Catch    CBC (No Diff)    Comprehensive Metabolic Panel    Vitamin D deficiency        Relevant Orders    Vitamin D,25-Hydroxy    Vitamin B deficiency        Relevant Orders    Vitamin B12 & Folate    Thyroid disorder screening        Relevant Orders    TSH Rfx On Abnormal To Free T4    Encounter for vitamin deficiency screening        Relevant Orders    Vitamin B12 & Folate    Vitamin D,25-Hydroxy    Screening for blood disease        Relevant Orders    CBC (No Diff)    Comprehensive Metabolic Panel    Lipid Panel    TSH Rfx On Abnormal To Free T4    Vitamin B12 & Folate    Vitamin D,25-Hydroxy         Type 2 diabetes- Chronic, unstable.  The patient will continue Synjardy and Januvia.   He will follow a strict diabetic diet, recheck A1c in 3 months.    Attention deficit disorder- Chronic, stable.  Continue Strattera.    Hyperlipidemia- Chronic, stable.  Continue atorvastatin.  Follow a heart healthy low cholesterol diet.  Completing fasting lipid panel today.    Hypertension- Chronic, stable.  Continue Bystolic.  Follow a heart healthy low cholesterol diet.      Current Outpatient Medications:   •  atomoxetine (STRATTERA) 60 MG capsule, TAKE ONE CAPSULE BY MOUTH DAILY, Disp: 90 capsule, Rfl: 3  •  atorvastatin (LIPITOR) 40 MG tablet, TAKE ONE TABLET BY MOUTH DAILY, Disp: 90 tablet, Rfl: 3  •  Blood Glucose Monitoring Suppl (ONE TOUCH ULTRA 2) w/Device kit, , Disp: , Rfl:   •  Lancets (ONETOUCH DELICA PLUS [id number]) misc, , Disp: , Rfl:   •  nebivolol (BYSTOLIC) 20 MG tablet, TAKE ONE TABLET BY MOUTH DAILY, Disp: 90 tablet, Rfl: 2  •  omeprazole (priLOSEC) 20 MG capsule, Take 1 capsule by mouth Daily., Disp: 90 capsule, Rfl: 2  •  OneTouch Ultra test strip, 1 each by Other route As Needed (diabetes)., Disp: 100 each, Rfl: 5  •  sildenafil (VIAGRA) 50 MG tablet, Take 1 tablet by mouth As Needed for Erectile Dysfunction., Disp: 30  tablet, Rfl: 5  •  SITagliptin (Januvia) 100 MG tablet, Take 1 tablet by mouth Daily., Disp: 90 tablet, Rfl: 2  •  Synjardy 12.5-1000 MG tablet, TAKE ONE TABLET BY MOUTH TWICE A DAY, Disp: 60 tablet, Rfl: 3       Plan of care reviewed with the patient at the conclusion of today's visit.  Education was provided regarding diagnosis, management, and any prescribed or recommended OTC medications.  Patient verbalized understanding of and agreement with management plan.     Return in about 3 months (around 8/5/2023), or if symptoms worsen or fail to improve, for Follow-up.      Transcribed from ambient dictation for MIRANDA Machuca by Laura Calabrese.  05/05/23   09:21 EDT    Patient or patient representative verbalized consent to the visit recording.  I have personally performed the services described in this document as transcribed by the above individual, and it is both accurate and complete.

## 2023-05-12 RX ORDER — AZITHROMYCIN 250 MG/1
TABLET, FILM COATED ORAL
Qty: 6 TABLET | Refills: 0 | Status: SHIPPED | OUTPATIENT
Start: 2023-05-12

## 2023-08-10 ENCOUNTER — OFFICE VISIT (OUTPATIENT)
Dept: INTERNAL MEDICINE | Facility: CLINIC | Age: 43
End: 2023-08-10
Payer: COMMERCIAL

## 2023-08-10 VITALS
WEIGHT: 226 LBS | BODY MASS INDEX: 32.35 KG/M2 | OXYGEN SATURATION: 99 % | HEART RATE: 64 BPM | TEMPERATURE: 97.9 F | SYSTOLIC BLOOD PRESSURE: 124 MMHG | RESPIRATION RATE: 16 BRPM | HEIGHT: 70 IN | DIASTOLIC BLOOD PRESSURE: 78 MMHG

## 2023-08-10 DIAGNOSIS — Z00.00 WELLNESS EXAMINATION: Primary | ICD-10-CM

## 2023-08-10 DIAGNOSIS — E11.65 TYPE 2 DIABETES MELLITUS WITH HYPERGLYCEMIA, WITHOUT LONG-TERM CURRENT USE OF INSULIN: ICD-10-CM

## 2023-08-10 LAB — HBA1C MFR BLD: 8.7 %

## 2023-08-10 RX ORDER — GLIPIZIDE 5 MG/1
TABLET ORAL
Qty: 60 TABLET | Refills: 2 | Status: SHIPPED | OUTPATIENT
Start: 2023-08-10

## 2023-08-10 NOTE — PROGRESS NOTES
Subjective   Chief Complaint   Patient presents with    Annual Exam        Sunil Aparicio is a 42 y.o. male.     The patient is here today for annual exam. A1C has went up to 8.7. Patient reports following diabetic diet and is physically active. He does not want to start any additional medications at this time for diabetes including an injectable is open to starting a med for when he eats bigger meals to help keep blood sugar manageable. No shortness of breath or chest pain.       Overall healthy:  Yes  Regular exercise:  Yes and Physically Active  Diet is well balanced:  Yes  Vitamin Supplement:  Yes, multivitamin  Alcohol intake:  No  Tobacco use:  No  Cardiovascular risk is low:  Low  PSA:  n/a, no FH  :  No urinary issues  Colonoscopy:  Up to date, normal colonoscopy in 2021.   GI:  Normal BM  Regular dental exam:  Up to date  Regular eye exam:  Due and Will self-schedule  Immunizations up to date:  Up to date  Wear a seatbelt regularly:  Yes  Wear sunscreen regularly when outdoors:  Yes    The 10-year ASCVD risk score ([employer], et al., 2019) is: 2.2%    Values used to calculate the score:      Age: 42 years      Sex: Male      Is Non- : No      Diabetic: Yes      Tobacco smoker: No      Systolic Blood Pressure: 124 mmHg      Is BP treated: Yes      HDL Cholesterol: 38 mg/dL      Total Cholesterol: 148 mg/dL      Past Medical History:   Diagnosis Date    ADD (attention deficit disorder)     Diabetes     Hyperlipidemia      Past Surgical History:   Procedure Laterality Date    ELBOW FUSION Left      Family History   Problem Relation Age of Onset    Stroke Father      Social History     Tobacco Use   Smoking Status Never   Smokeless Tobacco Never     Social History     Substance and Sexual Activity   Alcohol Use None     No Known Allergies    Review of Systems  Pertinent items are noted in HPI.     Current Outpatient Medications on File Prior to Visit   Medication Sig    atomoxetine  "(STRATTERA) 60 MG capsule TAKE ONE CAPSULE BY MOUTH DAILY    atorvastatin (LIPITOR) 40 MG tablet TAKE ONE TABLET BY MOUTH DAILY    Blood Glucose Monitoring Suppl (ONE TOUCH ULTRA 2) w/Device kit     Lancets (ONETOUCH DELICA PLUS [id number]) misc     nebivolol (BYSTOLIC) 20 MG tablet Take 1 tablet by mouth Daily.    omeprazole (priLOSEC) 20 MG capsule Take 1 capsule by mouth Daily.    OneTouch Ultra test strip 1 each by Other route As Needed (diabetes).    SITagliptin (Januvia) 100 MG tablet Take 1 tablet by mouth Daily.    Synjardy 12.5-1000 MG tablet TAKE ONE TABLET BY MOUTH TWICE A DAY     No current facility-administered medications on file prior to visit.       Objective   Vitals:    08/10/23 1045   BP: 124/78   Pulse: 64   Resp: 16   Temp: 97.9 øF (36.6 øC)   TempSrc: Tympanic   SpO2: 99%   Weight: 103 kg (226 lb)   Height: 177.8 cm (70\")     Body mass index is 32.43 kg/mý.    Physical Exam  Vitals reviewed.   Constitutional:       Appearance: Normal appearance. He is well-developed.   HENT:      Head: Normocephalic and atraumatic.      Right Ear: Hearing, tympanic membrane, ear canal and external ear normal.      Left Ear: Hearing, tympanic membrane, ear canal and external ear normal.      Nose: Nose normal.      Mouth/Throat:      Lips: Pink.      Mouth: Mucous membranes are moist.      Pharynx: Oropharynx is clear. Uvula midline.   Eyes:      General: Lids are normal.      Extraocular Movements: Extraocular movements intact.      Conjunctiva/sclera: Conjunctivae normal.      Pupils: Pupils are equal, round, and reactive to light.   Neck:      Thyroid: No thyromegaly.      Trachea: Trachea normal.   Cardiovascular:      Rate and Rhythm: Normal rate and regular rhythm.      Pulses:           Carotid pulses are 2+ on the right side and 2+ on the left side.     Heart sounds: Normal heart sounds.   Pulmonary:      Effort: Pulmonary effort is normal. No respiratory distress.      Breath sounds: Normal breath " sounds.   Abdominal:      General: Bowel sounds are normal.      Palpations: Abdomen is soft.      Tenderness: There is no abdominal tenderness.   Skin:     General: Skin is warm and dry.      Capillary Refill: Capillary refill takes 2 to 3 seconds.   Neurological:      Mental Status: He is alert and oriented to person, place, and time.      GCS: GCS eye subscore is 4. GCS verbal subscore is 5. GCS motor subscore is 6.   Psychiatric:         Attention and Perception: Attention normal.         Mood and Affect: Mood normal.         Speech: Speech normal.         Behavior: Behavior normal. Behavior is cooperative.         Thought Content: Thought content normal.       BMI is >= 30 and <35. (Class 1 Obesity). The following options were offered after discussion;: exercise counseling/recommendations and nutrition counseling/recommendations        Assessment & Plan     Current Outpatient Medications:     atomoxetine (STRATTERA) 60 MG capsule, TAKE ONE CAPSULE BY MOUTH DAILY, Disp: 90 capsule, Rfl: 3    atorvastatin (LIPITOR) 40 MG tablet, TAKE ONE TABLET BY MOUTH DAILY, Disp: 90 tablet, Rfl: 3    Blood Glucose Monitoring Suppl (ONE TOUCH ULTRA 2) w/Device kit, , Disp: , Rfl:     Lancets (ONETOUCH DELICA PLUS [id number]) misc, , Disp: , Rfl:     nebivolol (BYSTOLIC) 20 MG tablet, Take 1 tablet by mouth Daily., Disp: 90 tablet, Rfl: 2    omeprazole (priLOSEC) 20 MG capsule, Take 1 capsule by mouth Daily., Disp: 90 capsule, Rfl: 2    OneTouch Ultra test strip, 1 each by Other route As Needed (diabetes)., Disp: 100 each, Rfl: 5    SITagliptin (Januvia) 100 MG tablet, Take 1 tablet by mouth Daily., Disp: 90 tablet, Rfl: 2    Synjardy 12.5-1000 MG tablet, TAKE ONE TABLET BY MOUTH TWICE A DAY, Disp: 60 tablet, Rfl: 3    glipizide (Glucotrol) 5 MG tablet, Take one tablet by mouth twice daily as needed with large meals., Disp: 60 tablet, Rfl: 2    Problem List Items Addressed This Visit    None  Visit Diagnoses       Wellness  examination    -  Primary  Patient will continue to eat a well-balanced diet, drink adequate amount of water, exercise at least 3 times weekly, and get adequate rest.  Suggested a multivitamin daily.  Discussed future preventative screenings and immunizations.      Type 2 diabetes mellitus with hyperglycemia, without long-term current use of insulin      Chronic unstable. Continue januvia and synjardy. Start glipizide BID PRN with meals.     Relevant Medications    glipizide (Glucotrol) 5 MG tablet    Other Relevant Orders    POCT glycated hemoglobin, total (Completed)                    Counseling was given to patient for the following topics: appropriate exercise, healthy eating habits, disease prevention, risk factors for cancer, importance of self testicular exam, importance of immunizations, including risks and benefits, bone health, sun safety, and seatbelt use.     Plan of care reviewed with the patient at the conclusion of today's visit.  Education was provided regarding diagnosis, management, and any prescribed or recommended OTC medications.  Patient verbalized understanding of and agreement with management plan.     Return in about 3 months (around 11/10/2023), or if symptoms worsen or fail to improve.      Transcribed from ambient dictation for MIRANDA Machuca by MIRANDA Machuca.  08/10/23   11:00 EDT    Patient or patient representative verbalized consent to the visit recording.  I have personally performed the services described in this document as transcribed by the above individual, and it is both accurate and complete.

## 2023-09-05 ENCOUNTER — PRIOR AUTHORIZATION (OUTPATIENT)
Dept: INTERNAL MEDICINE | Facility: CLINIC | Age: 43
End: 2023-09-05
Payer: COMMERCIAL

## 2023-09-11 RX ORDER — ATOMOXETINE 60 MG/1
60 CAPSULE ORAL DAILY
Qty: 90 CAPSULE | Refills: 3 | Status: SHIPPED | OUTPATIENT
Start: 2023-09-11

## 2023-09-11 RX ORDER — OMEPRAZOLE 40 MG/1
40 CAPSULE, DELAYED RELEASE ORAL DAILY
Qty: 90 CAPSULE | Refills: 1 | Status: SHIPPED | OUTPATIENT
Start: 2023-09-11

## 2023-09-14 RX ORDER — LAMOTRIGINE 25 MG/1
TABLET ORAL
Qty: 42 TABLET | Refills: 0 | Status: SHIPPED | OUTPATIENT
Start: 2023-09-14

## 2023-09-14 RX ORDER — CYCLOBENZAPRINE HCL 5 MG
5 TABLET ORAL 2 TIMES DAILY PRN
Qty: 60 TABLET | Refills: 1 | Status: SHIPPED | OUTPATIENT
Start: 2023-09-14

## 2023-09-18 RX ORDER — EMPAGLIFLOZIN, METFORMIN HYDROCHLORIDE 25; 1000 MG/1; MG/1
1 TABLET, EXTENDED RELEASE ORAL DAILY
Qty: 90 TABLET | Refills: 2 | Status: SHIPPED | OUTPATIENT
Start: 2023-09-18

## 2023-10-11 RX ORDER — LAMOTRIGINE 25 MG/1
50 TABLET ORAL DAILY
Qty: 60 TABLET | Refills: 5 | Status: SHIPPED | OUTPATIENT
Start: 2023-10-11 | End: 2023-10-16 | Stop reason: SDUPTHER

## 2023-10-16 RX ORDER — LAMOTRIGINE 100 MG/1
100 TABLET ORAL DAILY
Qty: 90 TABLET | Refills: 1 | Status: SHIPPED | OUTPATIENT
Start: 2023-10-16

## 2023-11-03 RX ORDER — CYCLOBENZAPRINE HCL 10 MG
10 TABLET ORAL 3 TIMES DAILY PRN
Qty: 90 TABLET | Refills: 1 | Status: SHIPPED | OUTPATIENT
Start: 2023-11-03

## 2023-11-10 ENCOUNTER — PRIOR AUTHORIZATION (OUTPATIENT)
Dept: INTERNAL MEDICINE | Facility: CLINIC | Age: 43
End: 2023-11-10
Payer: COMMERCIAL

## 2023-11-14 RX ORDER — GLIPIZIDE 5 MG/1
TABLET ORAL
Qty: 180 TABLET | Refills: 2 | Status: SHIPPED | OUTPATIENT
Start: 2023-11-14

## 2023-11-16 RX ORDER — ATOMOXETINE 80 MG/1
80 CAPSULE ORAL DAILY
Qty: 90 CAPSULE | Refills: 2 | Status: SHIPPED | OUTPATIENT
Start: 2023-11-16

## 2023-11-16 RX ORDER — LAMOTRIGINE 150 MG/1
150 TABLET ORAL DAILY
Qty: 90 TABLET | Refills: 2 | Status: SHIPPED | OUTPATIENT
Start: 2023-11-16

## 2023-11-16 RX ORDER — CEPHALEXIN 750 MG/1
750 CAPSULE ORAL 2 TIMES DAILY
Qty: 28 CAPSULE | Refills: 0 | Status: SHIPPED | OUTPATIENT
Start: 2023-11-16 | End: 2023-11-30

## 2024-01-05 RX ORDER — ATORVASTATIN CALCIUM 40 MG/1
TABLET, FILM COATED ORAL
Qty: 90 TABLET | Refills: 3 | Status: SHIPPED | OUTPATIENT
Start: 2024-01-05

## 2024-01-06 ENCOUNTER — DOCUMENTATION (OUTPATIENT)
Dept: INTERNAL MEDICINE | Facility: CLINIC | Age: 44
End: 2024-01-06
Payer: COMMERCIAL

## 2024-01-06 RX ORDER — AMOXICILLIN 875 MG/1
875 TABLET, COATED ORAL 2 TIMES DAILY
Qty: 20 TABLET | Refills: 0 | Status: SHIPPED | OUTPATIENT
Start: 2024-01-06 | End: 2024-01-16

## 2024-01-06 RX ORDER — ERYTHROMYCIN 5 MG/G
OINTMENT OPHTHALMIC 4 TIMES DAILY
Qty: 3.5 G | Refills: 0 | Status: SHIPPED | OUTPATIENT
Start: 2024-01-06 | End: 2024-01-06 | Stop reason: SDUPTHER

## 2024-01-06 RX ORDER — ERYTHROMYCIN 5 MG/G
OINTMENT OPHTHALMIC 4 TIMES DAILY
Qty: 3.5 G | Refills: 0 | Status: SHIPPED | OUTPATIENT
Start: 2024-01-06 | End: 2024-01-13

## 2024-02-15 ENCOUNTER — OFFICE VISIT (OUTPATIENT)
Dept: INTERNAL MEDICINE | Facility: CLINIC | Age: 44
End: 2024-02-15
Payer: COMMERCIAL

## 2024-02-15 ENCOUNTER — LAB (OUTPATIENT)
Dept: INTERNAL MEDICINE | Facility: CLINIC | Age: 44
End: 2024-02-15
Payer: COMMERCIAL

## 2024-02-15 VITALS
BODY MASS INDEX: 33.5 KG/M2 | TEMPERATURE: 97.8 F | DIASTOLIC BLOOD PRESSURE: 74 MMHG | SYSTOLIC BLOOD PRESSURE: 138 MMHG | HEART RATE: 76 BPM | OXYGEN SATURATION: 98 % | WEIGHT: 234 LBS | HEIGHT: 70 IN | RESPIRATION RATE: 18 BRPM

## 2024-02-15 DIAGNOSIS — Z13.220 LIPID SCREENING: ICD-10-CM

## 2024-02-15 DIAGNOSIS — Z13.29 THYROID DISORDER SCREENING: ICD-10-CM

## 2024-02-15 DIAGNOSIS — E55.9 VITAMIN D DEFICIENCY: ICD-10-CM

## 2024-02-15 DIAGNOSIS — F41.8 DEPRESSION WITH ANXIETY: ICD-10-CM

## 2024-02-15 DIAGNOSIS — E78.2 MIXED HYPERLIPIDEMIA: ICD-10-CM

## 2024-02-15 DIAGNOSIS — E78.2 MIXED HYPERLIPIDEMIA: Primary | ICD-10-CM

## 2024-02-15 DIAGNOSIS — E11.65 TYPE 2 DIABETES MELLITUS WITH HYPERGLYCEMIA, WITHOUT LONG-TERM CURRENT USE OF INSULIN: Primary | ICD-10-CM

## 2024-02-15 DIAGNOSIS — I10 ESSENTIAL HYPERTENSION: ICD-10-CM

## 2024-02-15 DIAGNOSIS — Z13.21 ENCOUNTER FOR VITAMIN DEFICIENCY SCREENING: ICD-10-CM

## 2024-02-15 LAB
EXPIRATION DATE: ABNORMAL
HBA1C MFR BLD: 9.6 % (ref 4.5–5.7)
Lab: ABNORMAL

## 2024-02-15 PROCEDURE — 80053 COMPREHEN METABOLIC PANEL: CPT | Performed by: NURSE PRACTITIONER

## 2024-02-15 PROCEDURE — 82607 VITAMIN B-12: CPT | Performed by: NURSE PRACTITIONER

## 2024-02-15 PROCEDURE — 36415 COLL VENOUS BLD VENIPUNCTURE: CPT | Performed by: NURSE PRACTITIONER

## 2024-02-15 PROCEDURE — 82746 ASSAY OF FOLIC ACID SERUM: CPT | Performed by: NURSE PRACTITIONER

## 2024-02-15 PROCEDURE — 84443 ASSAY THYROID STIM HORMONE: CPT | Performed by: NURSE PRACTITIONER

## 2024-02-15 PROCEDURE — 85027 COMPLETE CBC AUTOMATED: CPT | Performed by: NURSE PRACTITIONER

## 2024-02-15 PROCEDURE — 82306 VITAMIN D 25 HYDROXY: CPT | Performed by: NURSE PRACTITIONER

## 2024-02-15 RX ORDER — SEMAGLUTIDE 1.34 MG/ML
0.25 INJECTION, SOLUTION SUBCUTANEOUS WEEKLY
Qty: 1.5 ML | Refills: 1 | Status: SHIPPED | OUTPATIENT
Start: 2024-02-15

## 2024-02-15 RX ORDER — LAMOTRIGINE 200 MG/1
200 TABLET ORAL DAILY
Qty: 90 TABLET | Refills: 1 | Status: SHIPPED | OUTPATIENT
Start: 2024-02-15

## 2024-02-15 NOTE — PROGRESS NOTES
Subjective   Chief Complaint   Patient presents with    Diabetes      Sunil Aparicio is a 43 y.o. male.     The patient is here today for a follow-up on diabetes. He is accompanied by an adult female.     A1C is 9.6 today. The patient expresses willingness to undergo a once-a-week injection regimen. Following a recent layoff, he has experienced reduced physical activity. He reports that at the time of job termination on 12/2023, his weight was 227 pounds. Although he engages in some household activities, his current activity level does not match that of his typical summertime routine. He reports that he is not taking glipizide consistently, occasionally forgetting to take it but recently resuming usage. He is currently taking Synjardy and Januvia.      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 He is optimistic that the prescribed injection will yield positive outcomes for his health. In the event of inadequate improvement, he acknowledges the possibility of recurring assessments every 3 months.       The adult female notes that the patient has heightened irritability and increased anxiety, accompanied by episodes of noticeable sweating. She seeks information on the potential adjustment of lamotrigine dosage. He is taking Lamictal for anxiety and mood and expresses uncertainty about the medication effectiveness. He is still feeling somewhat agitated.     He has attention deficit disorder, and the medication Strattera has worked well in managing the condition. He has tried multiple SSRIs and SNRIs in the past that exacerbated his symptoms.     I have reviewed the following portions of the patient's history and confirmed they are accurate: allergies, current medications, past family history, past medical history, past social history, past surgical history, and problem list    Review of Systems  Pertinent items are noted in HPI.     Current Outpatient Medications on File Prior to Visit   Medication Sig     "atomoxetine (Strattera) 80 MG capsule Take 1 capsule by mouth Daily.    atorvastatin (LIPITOR) 40 MG tablet TAKE ONE TABLET BY MOUTH DAILY    Blood Glucose Monitoring Suppl (ONE TOUCH ULTRA 2) w/Device kit     cyclobenzaprine (FLEXERIL) 10 MG tablet Take 1 tablet by mouth 3 (Three) Times a Day As Needed for Muscle Spasms.    Empagliflozin-metFORMIN HCl ER (Synjardy XR)  MG tablet sustained-release 24 hour Take 1 tablet by mouth Daily. D/C 12.5mg prescription    glipizide (GLUCOTROL) 5 MG tablet TAKE 1 TABLET BY MOUTH TWO TIMES A DAY AS NEEDED WITH LARGE MEALS    Lancets (ONETOUCH DELICA PLUS [id number]) misc     nebivolol (BYSTOLIC) 20 MG tablet Take 1 tablet by mouth Daily.    omeprazole (priLOSEC) 40 MG capsule Take 1 capsule by mouth Daily.    OneTouch Ultra test strip 1 each by Other route As Needed (diabetes).    SITagliptin (Januvia) 100 MG tablet Take 1 tablet by mouth Daily.    [DISCONTINUED] lamoTRIgine (LaMICtal) 150 MG tablet Take 1 tablet by mouth Daily.     No current facility-administered medications on file prior to visit.       Objective   Vitals:    02/15/24 1523   BP: 138/74   BP Location: Left arm   Patient Position: Sitting   Cuff Size: Adult   Pulse: 76   Resp: 18   Temp: 97.8 °F (36.6 °C)   TempSrc: Temporal   SpO2: 98%   Weight: 106 kg (234 lb)   Height: 177.8 cm (70\")   PainSc: 0-No pain     Body mass index is 33.58 kg/m².    Physical Exam  Vitals reviewed.   Constitutional:       Appearance: Normal appearance. He is well-developed.   HENT:      Head: Normocephalic and atraumatic.      Nose: Nose normal.   Eyes:      General: Lids are normal.      Conjunctiva/sclera: Conjunctivae normal.      Pupils: Pupils are equal, round, and reactive to light.   Neck:      Thyroid: No thyromegaly.      Trachea: Trachea normal.   Cardiovascular:      Rate and Rhythm: Normal rate and regular rhythm.      Heart sounds: Normal heart sounds.   Pulmonary:      Effort: Pulmonary effort is normal. No " respiratory distress.      Breath sounds: Normal breath sounds.   Skin:     General: Skin is warm and dry.   Neurological:      Mental Status: He is alert and oriented to person, place, and time.      GCS: GCS eye subscore is 4. GCS verbal subscore is 5. GCS motor subscore is 6.   Psychiatric:         Attention and Perception: Attention normal.         Mood and Affect: Mood normal.         Speech: Speech normal.         Behavior: Behavior normal. Behavior is cooperative.         Thought Content: Thought content normal.         Assessment & Plan   Problem List Items Addressed This Visit          Cardiac and Vasculature    Hyperlipidemia    Essential hypertension     Other Visit Diagnoses       Type 2 diabetes mellitus with hyperglycemia, without long-term current use of insulin    -  Primary    Relevant Medications    Semaglutide,0.25 or 0.5MG/DOS, (Ozempic, 0.25 or 0.5 MG/DOSE,) 2 MG/1.5ML solution pen-injector    Other Relevant Orders    POC Glycosylated Hemoglobin (Hb A1C) (Completed)    CBC (No Diff)    Comprehensive Metabolic Panel    Depression with anxiety        Thyroid disorder screening        Relevant Orders    TSH Rfx On Abnormal To Free T4    Vitamin D deficiency        Relevant Orders    Vitamin D,25-Hydroxy    Lipid screening        Encounter for vitamin deficiency screening        Relevant Orders    Vitamin B12 & Folate    Vitamin D,25-Hydroxy           1. Type 2 Diabetes.  - Chronic, unstable.   - Start Ozempic.   - Continue Januvia for the next month.   - After increasing Ozempic dose in one month, we will discontinue Januvia.   - Continue glipizide and Synjardy.   - Follow a diabetic diet.  However, his A1c level is elevated, measuring at 9.6%     2. Hypertension.  - Chronic, stable.   - Continue Bystolic.   - Follow heart healthy cholesterol diet.   - The patient will come by for fasting lipid panel.   - Completing CMP.    3. Mixed Hyperlipidemia.  - Chronic, stable.   - Continue atorvastatin.   -  Follow heart healthy, low cholesterol, high fiber diet.   - The patient will come by for fasting lipid panel.   - Check CMP.    4. Depression and Anxiety.  - Chronic, unstable.   - Increase Lamictal.   - Continue Strattera for attention deficit.      The laboratory results from 05/2023 revealed that the patient's vitamin B12 and vitamin D levels are within the normal range.      Current Outpatient Medications:     atomoxetine (Strattera) 80 MG capsule, Take 1 capsule by mouth Daily., Disp: 90 capsule, Rfl: 2    atorvastatin (LIPITOR) 40 MG tablet, TAKE ONE TABLET BY MOUTH DAILY, Disp: 90 tablet, Rfl: 3    Blood Glucose Monitoring Suppl (ONE TOUCH ULTRA 2) w/Device kit, , Disp: , Rfl:     cyclobenzaprine (FLEXERIL) 10 MG tablet, Take 1 tablet by mouth 3 (Three) Times a Day As Needed for Muscle Spasms., Disp: 90 tablet, Rfl: 1    Empagliflozin-metFORMIN HCl ER (Synjardy XR)  MG tablet sustained-release 24 hour, Take 1 tablet by mouth Daily. D/C 12.5mg prescription, Disp: 90 tablet, Rfl: 2    glipizide (GLUCOTROL) 5 MG tablet, TAKE 1 TABLET BY MOUTH TWO TIMES A DAY AS NEEDED WITH LARGE MEALS, Disp: 180 tablet, Rfl: 2    Lancets (ONETOUCH DELICA PLUS [id number]) misc, , Disp: , Rfl:     nebivolol (BYSTOLIC) 20 MG tablet, Take 1 tablet by mouth Daily., Disp: 90 tablet, Rfl: 2    omeprazole (priLOSEC) 40 MG capsule, Take 1 capsule by mouth Daily., Disp: 90 capsule, Rfl: 1    OneTouch Ultra test strip, 1 each by Other route As Needed (diabetes)., Disp: 100 each, Rfl: 5    SITagliptin (Januvia) 100 MG tablet, Take 1 tablet by mouth Daily., Disp: 90 tablet, Rfl: 2    lamoTRIgine (LaMICtal) 200 MG tablet, Take 1 tablet by mouth Daily., Disp: 90 tablet, Rfl: 1    Semaglutide,0.25 or 0.5MG/DOS, (Ozempic, 0.25 or 0.5 MG/DOSE,) 2 MG/1.5ML solution pen-injector, Inject 0.25 mg under the skin into the appropriate area as directed 1 (One) Time Per Week., Disp: 1.5 mL, Rfl: 1       Plan of care reviewed with the patient at the  conclusion of today's visit.  Education was provided regarding diagnosis, management, and any prescribed or recommended OTC medications.  Patient verbalized understanding of and agreement with management plan.     Return in about 3 months (around 5/15/2024), or if symptoms worsen or fail to improve, for Follow-up.      Transcribed from ambient dictation for MIRANDA Machuca by MIRANDA Machuca. / GILDA QDS: Steff Segovia.  02/15/24   15:48 EST    Patient or patient representative verbalized consent to the visit recording.  I have personally performed the services described in this document as transcribed by the above individual, and it is both accurate and complete.

## 2024-02-16 LAB
25(OH)D3 SERPL-MCNC: 37.6 NG/ML (ref 30–100)
ALBUMIN SERPL-MCNC: 4.7 G/DL (ref 3.5–5.2)
ALBUMIN/GLOB SERPL: 1.9 G/DL
ALP SERPL-CCNC: 84 U/L (ref 39–117)
ALT SERPL W P-5'-P-CCNC: 49 U/L (ref 1–41)
ANION GAP SERPL CALCULATED.3IONS-SCNC: 12.4 MMOL/L (ref 5–15)
AST SERPL-CCNC: 22 U/L (ref 1–40)
BILIRUB SERPL-MCNC: 0.6 MG/DL (ref 0–1.2)
BUN SERPL-MCNC: 14 MG/DL (ref 6–20)
BUN/CREAT SERPL: 14.7 (ref 7–25)
CALCIUM SPEC-SCNC: 9.5 MG/DL (ref 8.6–10.5)
CHLORIDE SERPL-SCNC: 101 MMOL/L (ref 98–107)
CO2 SERPL-SCNC: 22.6 MMOL/L (ref 22–29)
CREAT SERPL-MCNC: 0.95 MG/DL (ref 0.76–1.27)
DEPRECATED RDW RBC AUTO: 38 FL (ref 37–54)
EGFRCR SERPLBLD CKD-EPI 2021: 102.5 ML/MIN/1.73
ERYTHROCYTE [DISTWIDTH] IN BLOOD BY AUTOMATED COUNT: 13 % (ref 12.3–15.4)
FOLATE SERPL-MCNC: >20 NG/ML (ref 4.78–24.2)
GLOBULIN UR ELPH-MCNC: 2.5 GM/DL
GLUCOSE SERPL-MCNC: 241 MG/DL (ref 65–99)
HCT VFR BLD AUTO: 44.2 % (ref 37.5–51)
HGB BLD-MCNC: 14.9 G/DL (ref 13–17.7)
MCH RBC QN AUTO: 27.9 PG (ref 26.6–33)
MCHC RBC AUTO-ENTMCNC: 33.7 G/DL (ref 31.5–35.7)
MCV RBC AUTO: 82.8 FL (ref 79–97)
PLATELET # BLD AUTO: 269 10*3/MM3 (ref 140–450)
PMV BLD AUTO: 10.6 FL (ref 6–12)
POTASSIUM SERPL-SCNC: 4.1 MMOL/L (ref 3.5–5.2)
PROT SERPL-MCNC: 7.2 G/DL (ref 6–8.5)
RBC # BLD AUTO: 5.34 10*6/MM3 (ref 4.14–5.8)
SODIUM SERPL-SCNC: 136 MMOL/L (ref 136–145)
TSH SERPL DL<=0.05 MIU/L-ACNC: 1.75 UIU/ML (ref 0.27–4.2)
VIT B12 BLD-MCNC: 770 PG/ML (ref 211–946)
WBC NRBC COR # BLD AUTO: 8.35 10*3/MM3 (ref 3.4–10.8)

## 2024-03-05 RX ORDER — OMEPRAZOLE 40 MG/1
40 CAPSULE, DELAYED RELEASE ORAL DAILY
Qty: 90 CAPSULE | Refills: 1 | Status: SHIPPED | OUTPATIENT
Start: 2024-03-05

## 2024-03-25 DIAGNOSIS — E11.65 TYPE 2 DIABETES MELLITUS WITH HYPERGLYCEMIA, WITHOUT LONG-TERM CURRENT USE OF INSULIN: ICD-10-CM

## 2024-03-25 RX ORDER — SEMAGLUTIDE 1.34 MG/ML
0.5 INJECTION, SOLUTION SUBCUTANEOUS WEEKLY
Qty: 2 ML | Refills: 0 | Status: SHIPPED | OUTPATIENT
Start: 2024-03-25

## 2024-05-13 RX ORDER — NEBIVOLOL 20 MG/1
20 TABLET ORAL DAILY
Qty: 90 TABLET | Refills: 2 | Status: SHIPPED | OUTPATIENT
Start: 2024-05-13

## 2024-05-30 DIAGNOSIS — E11.65 TYPE 2 DIABETES MELLITUS WITH HYPERGLYCEMIA, WITHOUT LONG-TERM CURRENT USE OF INSULIN: ICD-10-CM

## 2024-05-30 RX ORDER — SEMAGLUTIDE 1.34 MG/ML
0.5 INJECTION, SOLUTION SUBCUTANEOUS WEEKLY
Qty: 2 ML | Refills: 2 | Status: SHIPPED | OUTPATIENT
Start: 2024-05-30

## 2024-07-24 RX ORDER — CYCLOBENZAPRINE HCL 10 MG
10 TABLET ORAL 3 TIMES DAILY PRN
Qty: 90 TABLET | Refills: 1 | Status: SHIPPED | OUTPATIENT
Start: 2024-07-24

## 2024-08-12 RX ORDER — LAMOTRIGINE 200 MG/1
200 TABLET ORAL DAILY
Qty: 90 TABLET | Refills: 1 | Status: SHIPPED | OUTPATIENT
Start: 2024-08-12

## 2024-09-18 RX ORDER — ATOMOXETINE 80 MG/1
80 CAPSULE ORAL DAILY
Qty: 30 CAPSULE | Refills: 2 | Status: SHIPPED | OUTPATIENT
Start: 2024-09-18

## 2024-10-22 RX ORDER — OMEPRAZOLE 40 MG/1
40 CAPSULE, DELAYED RELEASE ORAL DAILY
Qty: 90 CAPSULE | Refills: 3 | Status: SHIPPED | OUTPATIENT
Start: 2024-10-22

## 2024-12-23 ENCOUNTER — OFFICE VISIT (OUTPATIENT)
Dept: INTERNAL MEDICINE | Facility: CLINIC | Age: 44
End: 2024-12-23
Payer: COMMERCIAL

## 2024-12-23 VITALS
OXYGEN SATURATION: 96 % | WEIGHT: 234 LBS | DIASTOLIC BLOOD PRESSURE: 80 MMHG | TEMPERATURE: 97.3 F | RESPIRATION RATE: 16 BRPM | BODY MASS INDEX: 33.5 KG/M2 | HEIGHT: 70 IN | HEART RATE: 64 BPM | SYSTOLIC BLOOD PRESSURE: 138 MMHG

## 2024-12-23 DIAGNOSIS — E11.65 TYPE 2 DIABETES MELLITUS WITH HYPERGLYCEMIA, WITHOUT LONG-TERM CURRENT USE OF INSULIN: Primary | ICD-10-CM

## 2024-12-23 DIAGNOSIS — F33.1 MAJOR DEPRESSIVE DISORDER, RECURRENT EPISODE, MODERATE DEGREE: ICD-10-CM

## 2024-12-23 DIAGNOSIS — F51.01 PRIMARY INSOMNIA: ICD-10-CM

## 2024-12-23 LAB
EXPIRATION DATE: ABNORMAL
HBA1C MFR BLD: 10.3 % (ref 4.5–5.7)
Lab: ABNORMAL

## 2024-12-23 PROCEDURE — 83036 HEMOGLOBIN GLYCOSYLATED A1C: CPT | Performed by: NURSE PRACTITIONER

## 2024-12-23 PROCEDURE — 99214 OFFICE O/P EST MOD 30 MIN: CPT | Performed by: NURSE PRACTITIONER

## 2024-12-23 RX ORDER — QUETIAPINE FUMARATE 25 MG/1
TABLET, FILM COATED ORAL
Qty: 45 TABLET | Refills: 0 | Status: SHIPPED | OUTPATIENT
Start: 2024-12-23

## 2024-12-23 RX ORDER — DULAGLUTIDE 0.75 MG/.5ML
0.75 INJECTION, SOLUTION SUBCUTANEOUS WEEKLY
Qty: 2 ML | Refills: 1 | Status: SHIPPED | OUTPATIENT
Start: 2024-12-23

## 2024-12-23 RX ORDER — VILAZODONE HYDROCHLORIDE 10 MG/1
10 TABLET ORAL DAILY
Qty: 30 TABLET | Refills: 1 | Status: SHIPPED | OUTPATIENT
Start: 2024-12-23

## 2024-12-23 NOTE — PROGRESS NOTES
Subjective   Chief Complaint   Patient presents with    Diabetes    Primary Care Follow-Up        Sunil Aparicio is a 44 y.o. male.    History of Present Illness  The patient presents for evaluation of diabetes, anxiety, and sleep apnea.    He has discontinued the use of Ozempic due to significant weight loss, even though it did not cause any gastrointestinal disturbances. He is currently on a regimen of Synjardy and glipizide, administered daily in the morning. He expresses a desire to maintain his current weight and is open to trying Trulicity. His work schedule fluctuates with the seasons, leading to weight loss during the summer due to increased activity and reduced food intake, and weight gain in the fall due to increased food consumption. 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    He has been experiencing irritability and anxiety, which have been escalating recently. He continues to take Strattera and duloxetine. Previous trials of Zoloft and Trintellix were ineffective, but he did not experience any adverse effects from these medications.    He has a long-standing history of sleep apnea, characterized by a short sleep duration of 4 to 5 hours per night. He does not experience any discomfort or restlessness at night but reports difficulty achieving deep sleep unless he is extremely fatigued. He has declined the use of sleep aids. He has attempted to manage his sleep issues with melatonin and Seroquel, but these interventions have been ineffective. He is currently on a 200 mg dose of Seroquel but continues to struggle with sleep.    MEDICATIONS  Current: Synjardy, glipizide, metformin, Strattera, duloxetine, Seroquel  Discontinued: Ozempic, Zoloft, Trintellix    I have reviewed the following portions of the patient's history and confirmed they are accurate: allergies, current medications, past family history, past  "medical history, past social history, past surgical history, and problem list    Review of Systems  Pertinent items are noted in HPI.     Current Outpatient Medications on File Prior to Visit   Medication Sig    atorvastatin (LIPITOR) 40 MG tablet TAKE ONE TABLET BY MOUTH DAILY    Blood Glucose Monitoring Suppl (ONE TOUCH ULTRA 2) w/Device kit     cyclobenzaprine (FLEXERIL) 10 MG tablet TAKE ONE TABLET BY MOUTH THREE TIMES A DAY AS NEEDED FOR MUSCLE SPASMS    Empagliflozin-metFORMIN HCl ER (Synjardy XR)  MG tablet sustained-release 24 hour Take 1 tablet by mouth Daily. D/C 12.5mg prescription    glipizide (GLUCOTROL) 5 MG tablet TAKE 1 TABLET BY MOUTH TWO TIMES A DAY AS NEEDED WITH LARGE MEALS    lamoTRIgine (LaMICtal) 200 MG tablet TAKE 1 TABLET BY MOUTH DAILY    Lancets (ONETOUCH DELICA PLUS [id number]) misc     nebivolol (BYSTOLIC) 20 MG tablet TAKE 1 TABLET BY MOUTH DAILY    omeprazole (priLOSEC) 40 MG capsule TAKE 1 CAPSULE BY MOUTH DAILY    OneTouch Ultra test strip 1 each by Other route As Needed (diabetes).     No current facility-administered medications on file prior to visit.       Objective   Vitals:    12/23/24 1250   BP: 138/80   BP Location: Left arm   Patient Position: Sitting   Cuff Size: Adult   Pulse: 64   Resp: 16   Temp: 97.3 °F (36.3 °C)   TempSrc: Tympanic   SpO2: 96%   Weight: 106 kg (234 lb)   Height: 177.8 cm (70\")     Body mass index is 33.58 kg/m².    Physical Exam  Vitals reviewed.   Constitutional:       Appearance: Normal appearance. He is well-developed.   HENT:      Head: Normocephalic and atraumatic.      Nose: Nose normal.   Eyes:      General: Lids are normal.      Conjunctiva/sclera: Conjunctivae normal.      Pupils: Pupils are equal, round, and reactive to light.   Neck:      Thyroid: No thyromegaly.      Trachea: Trachea normal.   Cardiovascular:      Rate and Rhythm: Normal rate and regular rhythm.      Heart sounds: Normal heart sounds.   Pulmonary:      Effort: " Pulmonary effort is normal. No respiratory distress.      Breath sounds: Normal breath sounds.   Skin:     General: Skin is warm and dry.   Neurological:      Mental Status: He is alert and oriented to person, place, and time.      GCS: GCS eye subscore is 4. GCS verbal subscore is 5. GCS motor subscore is 6.   Psychiatric:         Attention and Perception: Attention normal.         Mood and Affect: Mood normal.         Speech: Speech normal.         Behavior: Behavior normal. Behavior is cooperative.         Thought Content: Thought content normal.         Results  Laboratory Studies  Blood sugar is 10.3.    Lab Results   Component Value Date    WBC 8.35 02/15/2024    HGB 14.9 02/15/2024    HCT 44.2 02/15/2024    MCV 82.8 02/15/2024     02/15/2024      Lab Results   Component Value Date    GLUCOSE 241 (H) 02/15/2024    BUN 14 02/15/2024    CREATININE 0.95 02/15/2024     02/15/2024    K 4.1 02/15/2024     02/15/2024    CALCIUM 9.5 02/15/2024    PROTEINTOT 7.2 02/15/2024    ALBUMIN 4.7 02/15/2024    ALT 49 (H) 02/15/2024    AST 22 02/15/2024    ALKPHOS 84 02/15/2024    BILITOT 0.6 02/15/2024    GLOB 2.5 02/15/2024    AGRATIO 1.9 02/15/2024    BCR 14.7 02/15/2024    ANIONGAP 12.4 02/15/2024    EGFR 102.5 02/15/2024      Lab Results   Component Value Date    HGBA1C 10.3 (A) 12/23/2024      Lab Results   Component Value Date    CHOL 148 05/05/2023    CHLPL 161 06/05/2020    TRIG 82 05/05/2023    HDL 38 (L) 05/05/2023    LDL 94 05/05/2023      Lab Results   Component Value Date    TSH 1.750 02/15/2024          Assessment & Plan   Problem List Items Addressed This Visit    None  Visit Diagnoses       Type 2 diabetes mellitus with hyperglycemia, without long-term current use of insulin    -  Primary    Relevant Medications    Dulaglutide (Trulicity) 0.75 MG/0.5ML solution auto-injector    Other Relevant Orders    POC Glycosylated Hemoglobin (Hb A1C) (Completed)    Major depressive disorder, recurrent  episode, moderate degree        Relevant Medications    vilazodone (VIIBRYD) 10 MG tablet tablet    QUEtiapine (SEROquel) 25 MG tablet    Primary insomnia        Relevant Medications    QUEtiapine (SEROquel) 25 MG tablet               Current Outpatient Medications:     atorvastatin (LIPITOR) 40 MG tablet, TAKE ONE TABLET BY MOUTH DAILY, Disp: 90 tablet, Rfl: 3    Blood Glucose Monitoring Suppl (ONE TOUCH ULTRA 2) w/Device kit, , Disp: , Rfl:     cyclobenzaprine (FLEXERIL) 10 MG tablet, TAKE ONE TABLET BY MOUTH THREE TIMES A DAY AS NEEDED FOR MUSCLE SPASMS, Disp: 90 tablet, Rfl: 1    Empagliflozin-metFORMIN HCl ER (Synjardy XR)  MG tablet sustained-release 24 hour, Take 1 tablet by mouth Daily. D/C 12.5mg prescription, Disp: 90 tablet, Rfl: 2    glipizide (GLUCOTROL) 5 MG tablet, TAKE 1 TABLET BY MOUTH TWO TIMES A DAY AS NEEDED WITH LARGE MEALS, Disp: 180 tablet, Rfl: 2    lamoTRIgine (LaMICtal) 200 MG tablet, TAKE 1 TABLET BY MOUTH DAILY, Disp: 90 tablet, Rfl: 1    Lancets (ONETOUCH DELICA PLUS [id number]) misc, , Disp: , Rfl:     nebivolol (BYSTOLIC) 20 MG tablet, TAKE 1 TABLET BY MOUTH DAILY, Disp: 90 tablet, Rfl: 2    omeprazole (priLOSEC) 40 MG capsule, TAKE 1 CAPSULE BY MOUTH DAILY, Disp: 90 capsule, Rfl: 3    OneTouch Ultra test strip, 1 each by Other route As Needed (diabetes)., Disp: 100 each, Rfl: 5    atomoxetine (STRATTERA) 80 MG capsule, TAKE 1 CAPSULE BY MOUTH DAILY, Disp: 30 capsule, Rfl: 3    Dulaglutide (Trulicity) 0.75 MG/0.5ML solution auto-injector, Inject 0.75 mg under the skin into the appropriate area as directed 1 (One) Time Per Week., Disp: 2 mL, Rfl: 1    QUEtiapine (SEROquel) 25 MG tablet, Take 1-2 tablets 30 minutes before bedtime as needed for sleep., Disp: 45 tablet, Rfl: 0    vilazodone (VIIBRYD) 10 MG tablet tablet, Take 1 tablet by mouth Daily., Disp: 30 tablet, Rfl: 1    Assessment & Plan  1. Diabetes mellitus.  His blood glucose levels are suboptimal, with an A1c of 10.3. He  has discontinued Ozempic due to significant weight loss. The potential addition of Januvia was discussed, but it was deemed unlikely to significantly impact his blood glucose levels. The reintroduction of Ozempic or the initiation of insulin therapy were considered as viable options. Trulicity was also discussed as an alternative, which could effectively manage his blood glucose levels without causing weight loss or gastrointestinal discomfort. He will continue his current regimen of Synjardy and glipizide. A prescription for Trulicity will be provided, to be administered once weekly. Blood glucose levels will be reassessed in 3 months.    2. Anxiety.  His symptoms appear to be more indicative of anxiety rather than depression. A prescription for Viibryd 10 mg will be provided, with the option to increase the dosage to 20 mg after a period of 2 weeks. He is advised to inform us if he experiences any adverse effects from the medication.    3. Sleep apnea.  He has a long-standing history of sleep apnea, characterized by a short sleep duration of 4 to 5 hours per night. The possibility of conducting a home sleep study was discussed. A prescription for Seroquel 25 mg will be provided, with the option to take 1 to 2 tablets as needed for sleep.         Plan of care reviewed with the patient at the conclusion of today's visit.  Education was provided regarding diagnosis, management, and any prescribed or recommended OTC medications.  Patient verbalized understanding of and agreement with management plan.     Return in about 3 months (around 3/23/2025), or if symptoms worsen or fail to improve.      Transcribed from ambient dictation for MIRANDA Machuca by MIRANDA Machuca.  12/23/24   13:22 EST    Patient or patient representative verbalized consent for the use of Ambient Listening during the visit with  MIRANDA Machuca for chart documentation. 12/28/2024  22:56 EST

## 2024-12-26 ENCOUNTER — PRIOR AUTHORIZATION (OUTPATIENT)
Dept: INTERNAL MEDICINE | Facility: CLINIC | Age: 44
End: 2024-12-26
Payer: COMMERCIAL

## 2024-12-26 RX ORDER — ATOMOXETINE 80 MG/1
80 CAPSULE ORAL DAILY
Qty: 30 CAPSULE | Refills: 3 | Status: SHIPPED | OUTPATIENT
Start: 2024-12-26

## 2025-01-09 ENCOUNTER — LAB (OUTPATIENT)
Dept: INTERNAL MEDICINE | Facility: CLINIC | Age: 45
End: 2025-01-09
Payer: COMMERCIAL

## 2025-01-09 DIAGNOSIS — I10 ESSENTIAL HYPERTENSION: ICD-10-CM

## 2025-01-09 DIAGNOSIS — E11.65 TYPE 2 DIABETES MELLITUS WITH HYPERGLYCEMIA, WITHOUT LONG-TERM CURRENT USE OF INSULIN: ICD-10-CM

## 2025-01-09 DIAGNOSIS — E11.65 TYPE 2 DIABETES MELLITUS WITH HYPERGLYCEMIA, WITHOUT LONG-TERM CURRENT USE OF INSULIN: Primary | ICD-10-CM

## 2025-01-09 DIAGNOSIS — E78.2 MIXED HYPERLIPIDEMIA: ICD-10-CM

## 2025-01-09 LAB
DEPRECATED RDW RBC AUTO: 38 FL (ref 37–54)
ERYTHROCYTE [DISTWIDTH] IN BLOOD BY AUTOMATED COUNT: 12.7 % (ref 12.3–15.4)
HCT VFR BLD AUTO: 45.9 % (ref 37.5–51)
HGB BLD-MCNC: 15.5 G/DL (ref 13–17.7)
MCH RBC QN AUTO: 28 PG (ref 26.6–33)
MCHC RBC AUTO-ENTMCNC: 33.8 G/DL (ref 31.5–35.7)
MCV RBC AUTO: 82.9 FL (ref 79–97)
PLATELET # BLD AUTO: 234 10*3/MM3 (ref 140–450)
PMV BLD AUTO: 10.1 FL (ref 6–12)
RBC # BLD AUTO: 5.54 10*6/MM3 (ref 4.14–5.8)
WBC NRBC COR # BLD AUTO: 7.72 10*3/MM3 (ref 3.4–10.8)

## 2025-01-09 PROCEDURE — 36415 COLL VENOUS BLD VENIPUNCTURE: CPT | Performed by: NURSE PRACTITIONER

## 2025-01-09 PROCEDURE — 85027 COMPLETE CBC AUTOMATED: CPT | Performed by: NURSE PRACTITIONER

## 2025-01-09 PROCEDURE — 80061 LIPID PANEL: CPT | Performed by: NURSE PRACTITIONER

## 2025-01-09 PROCEDURE — 80053 COMPREHEN METABOLIC PANEL: CPT | Performed by: NURSE PRACTITIONER

## 2025-01-10 DIAGNOSIS — F51.01 PRIMARY INSOMNIA: ICD-10-CM

## 2025-01-10 LAB
ALBUMIN SERPL-MCNC: 4.6 G/DL (ref 3.5–5.2)
ALBUMIN/GLOB SERPL: 1.6 G/DL
ALP SERPL-CCNC: 88 U/L (ref 39–117)
ALT SERPL W P-5'-P-CCNC: 43 U/L (ref 1–41)
ANION GAP SERPL CALCULATED.3IONS-SCNC: 15 MMOL/L (ref 5–15)
AST SERPL-CCNC: 27 U/L (ref 1–40)
BILIRUB SERPL-MCNC: 0.9 MG/DL (ref 0–1.2)
BUN SERPL-MCNC: 12 MG/DL (ref 6–20)
BUN/CREAT SERPL: 12.5 (ref 7–25)
CALCIUM SPEC-SCNC: 9.7 MG/DL (ref 8.6–10.5)
CHLORIDE SERPL-SCNC: 100 MMOL/L (ref 98–107)
CHOLEST SERPL-MCNC: 176 MG/DL (ref 0–200)
CO2 SERPL-SCNC: 23 MMOL/L (ref 22–29)
CREAT SERPL-MCNC: 0.96 MG/DL (ref 0.76–1.27)
EGFRCR SERPLBLD CKD-EPI 2021: 100.6 ML/MIN/1.73
GLOBULIN UR ELPH-MCNC: 2.8 GM/DL
GLUCOSE SERPL-MCNC: 90 MG/DL (ref 65–99)
HDLC SERPL-MCNC: 42 MG/DL (ref 40–60)
LDLC SERPL CALC-MCNC: 113 MG/DL (ref 0–100)
LDLC/HDLC SERPL: 2.64 {RATIO}
POTASSIUM SERPL-SCNC: 3.9 MMOL/L (ref 3.5–5.2)
PROT SERPL-MCNC: 7.4 G/DL (ref 6–8.5)
SODIUM SERPL-SCNC: 138 MMOL/L (ref 136–145)
TRIGL SERPL-MCNC: 116 MG/DL (ref 0–150)
VLDLC SERPL-MCNC: 21 MG/DL (ref 5–40)

## 2025-01-10 RX ORDER — QUETIAPINE FUMARATE 25 MG/1
TABLET, FILM COATED ORAL
Qty: 45 TABLET | Refills: 1 | Status: SHIPPED | OUTPATIENT
Start: 2025-01-10

## 2025-01-12 DIAGNOSIS — E11.65 TYPE 2 DIABETES MELLITUS WITH HYPERGLYCEMIA, WITHOUT LONG-TERM CURRENT USE OF INSULIN: Primary | ICD-10-CM

## 2025-01-12 RX ORDER — ATORVASTATIN CALCIUM 80 MG/1
80 TABLET, FILM COATED ORAL NIGHTLY
Qty: 90 TABLET | Refills: 1 | Status: SHIPPED | OUTPATIENT
Start: 2025-01-12

## 2025-01-12 RX ORDER — DULAGLUTIDE 1.5 MG/.5ML
1.5 INJECTION, SOLUTION SUBCUTANEOUS WEEKLY
Qty: 2 ML | Refills: 0 | Status: SHIPPED | OUTPATIENT
Start: 2025-01-12

## 2025-01-16 RX ORDER — ATORVASTATIN CALCIUM 40 MG/1
40 TABLET, FILM COATED ORAL DAILY
Qty: 90 TABLET | Refills: 3 | Status: SHIPPED | OUTPATIENT
Start: 2025-01-16

## 2025-01-24 RX ORDER — EMPAGLIFLOZIN, METFORMIN HYDROCHLORIDE 25; 1000 MG/1; MG/1
1 TABLET, EXTENDED RELEASE ORAL DAILY
Qty: 90 TABLET | Refills: 2 | Status: SHIPPED | OUTPATIENT
Start: 2025-01-24

## 2025-02-09 DIAGNOSIS — E11.65 TYPE 2 DIABETES MELLITUS WITH HYPERGLYCEMIA, WITHOUT LONG-TERM CURRENT USE OF INSULIN: ICD-10-CM

## 2025-02-10 RX ORDER — DULAGLUTIDE 1.5 MG/.5ML
1.5 INJECTION, SOLUTION SUBCUTANEOUS WEEKLY
Qty: 2 ML | Refills: 3 | Status: SHIPPED | OUTPATIENT
Start: 2025-02-10

## 2025-02-19 DIAGNOSIS — F51.01 PRIMARY INSOMNIA: ICD-10-CM

## 2025-02-19 RX ORDER — QUETIAPINE FUMARATE 25 MG/1
TABLET, FILM COATED ORAL
Qty: 45 TABLET | Refills: 1 | Status: SHIPPED | OUTPATIENT
Start: 2025-02-19

## 2025-02-24 RX ORDER — NEBIVOLOL 20 MG/1
20 TABLET ORAL DAILY
Qty: 90 TABLET | Refills: 2 | Status: SHIPPED | OUTPATIENT
Start: 2025-02-24

## 2025-03-13 RX ORDER — LAMOTRIGINE 200 MG/1
200 TABLET ORAL DAILY
Qty: 90 TABLET | Refills: 3 | Status: SHIPPED | OUTPATIENT
Start: 2025-03-13

## 2025-03-25 ENCOUNTER — LAB (OUTPATIENT)
Dept: INTERNAL MEDICINE | Facility: CLINIC | Age: 45
End: 2025-03-25
Payer: COMMERCIAL

## 2025-03-25 ENCOUNTER — OFFICE VISIT (OUTPATIENT)
Dept: INTERNAL MEDICINE | Facility: CLINIC | Age: 45
End: 2025-03-25
Payer: COMMERCIAL

## 2025-03-25 VITALS
HEART RATE: 81 BPM | BODY MASS INDEX: 33.96 KG/M2 | DIASTOLIC BLOOD PRESSURE: 84 MMHG | TEMPERATURE: 98.7 F | WEIGHT: 237.2 LBS | HEIGHT: 70 IN | OXYGEN SATURATION: 96 % | SYSTOLIC BLOOD PRESSURE: 118 MMHG

## 2025-03-25 DIAGNOSIS — E11.65 TYPE 2 DIABETES MELLITUS WITH HYPERGLYCEMIA, WITHOUT LONG-TERM CURRENT USE OF INSULIN: Primary | ICD-10-CM

## 2025-03-25 DIAGNOSIS — Z13.1 SCREENING FOR DIABETES MELLITUS: ICD-10-CM

## 2025-03-25 DIAGNOSIS — E55.9 VITAMIN D DEFICIENCY: ICD-10-CM

## 2025-03-25 DIAGNOSIS — Z13.21 ENCOUNTER FOR VITAMIN DEFICIENCY SCREENING: ICD-10-CM

## 2025-03-25 DIAGNOSIS — F32.A ANXIETY AND DEPRESSION: ICD-10-CM

## 2025-03-25 DIAGNOSIS — Z13.29 THYROID DISORDER SCREENING: ICD-10-CM

## 2025-03-25 DIAGNOSIS — E78.2 MIXED HYPERLIPIDEMIA: ICD-10-CM

## 2025-03-25 DIAGNOSIS — Z13.220 LIPID SCREENING: ICD-10-CM

## 2025-03-25 DIAGNOSIS — F98.8 ATTENTION DEFICIT DISORDER (ADD) WITHOUT HYPERACTIVITY: ICD-10-CM

## 2025-03-25 DIAGNOSIS — F41.9 ANXIETY AND DEPRESSION: ICD-10-CM

## 2025-03-25 DIAGNOSIS — I10 ESSENTIAL HYPERTENSION: ICD-10-CM

## 2025-03-25 DIAGNOSIS — Z13.0 SCREENING FOR BLOOD DISEASE: ICD-10-CM

## 2025-03-25 LAB
25(OH)D3 SERPL-MCNC: 37.5 NG/ML (ref 30–100)
ALBUMIN SERPL-MCNC: 4.5 G/DL (ref 3.5–5.2)
ALBUMIN/GLOB SERPL: 1.8 G/DL
ALP SERPL-CCNC: 94 U/L (ref 39–117)
ALT SERPL W P-5'-P-CCNC: 52 U/L (ref 1–41)
ANION GAP SERPL CALCULATED.3IONS-SCNC: 13 MMOL/L (ref 5–15)
AST SERPL-CCNC: 22 U/L (ref 1–40)
BILIRUB SERPL-MCNC: 1.1 MG/DL (ref 0–1.2)
BUN SERPL-MCNC: 13 MG/DL (ref 6–20)
BUN/CREAT SERPL: 12.6 (ref 7–25)
CALCIUM SPEC-SCNC: 9.8 MG/DL (ref 8.6–10.5)
CHLORIDE SERPL-SCNC: 100 MMOL/L (ref 98–107)
CHOLEST SERPL-MCNC: 139 MG/DL (ref 0–200)
CO2 SERPL-SCNC: 24 MMOL/L (ref 22–29)
CREAT SERPL-MCNC: 1.03 MG/DL (ref 0.76–1.27)
DEPRECATED RDW RBC AUTO: 41.3 FL (ref 37–54)
EGFRCR SERPLBLD CKD-EPI 2021: 92.4 ML/MIN/1.73
ERYTHROCYTE [DISTWIDTH] IN BLOOD BY AUTOMATED COUNT: 13.6 % (ref 12.3–15.4)
EXPIRATION DATE: ABNORMAL
FOLATE SERPL-MCNC: >20 NG/ML (ref 4.78–24.2)
GLOBULIN UR ELPH-MCNC: 2.5 GM/DL
GLUCOSE SERPL-MCNC: 167 MG/DL (ref 65–99)
HBA1C MFR BLD: 9.2 % (ref 4.5–5.7)
HCT VFR BLD AUTO: 45.7 % (ref 37.5–51)
HDLC SERPL-MCNC: 29 MG/DL (ref 40–60)
HGB BLD-MCNC: 14.7 G/DL (ref 13–17.7)
LDLC SERPL CALC-MCNC: 87 MG/DL (ref 0–100)
LDLC/HDLC SERPL: 2.93 {RATIO}
Lab: ABNORMAL
MCH RBC QN AUTO: 27 PG (ref 26.6–33)
MCHC RBC AUTO-ENTMCNC: 32.2 G/DL (ref 31.5–35.7)
MCV RBC AUTO: 84 FL (ref 79–97)
PLATELET # BLD AUTO: 286 10*3/MM3 (ref 140–450)
PMV BLD AUTO: 10.7 FL (ref 6–12)
POTASSIUM SERPL-SCNC: 4.4 MMOL/L (ref 3.5–5.2)
PROT SERPL-MCNC: 7 G/DL (ref 6–8.5)
RBC # BLD AUTO: 5.44 10*6/MM3 (ref 4.14–5.8)
SODIUM SERPL-SCNC: 137 MMOL/L (ref 136–145)
TRIGL SERPL-MCNC: 125 MG/DL (ref 0–150)
TSH SERPL DL<=0.05 MIU/L-ACNC: 1.52 UIU/ML (ref 0.27–4.2)
VIT B12 BLD-MCNC: 746 PG/ML (ref 211–946)
VLDLC SERPL-MCNC: 23 MG/DL (ref 5–40)
WBC NRBC COR # BLD AUTO: 8.21 10*3/MM3 (ref 3.4–10.8)

## 2025-03-25 PROCEDURE — 82746 ASSAY OF FOLIC ACID SERUM: CPT | Performed by: NURSE PRACTITIONER

## 2025-03-25 PROCEDURE — 84443 ASSAY THYROID STIM HORMONE: CPT | Performed by: NURSE PRACTITIONER

## 2025-03-25 PROCEDURE — 82306 VITAMIN D 25 HYDROXY: CPT | Performed by: NURSE PRACTITIONER

## 2025-03-25 PROCEDURE — 85027 COMPLETE CBC AUTOMATED: CPT | Performed by: NURSE PRACTITIONER

## 2025-03-25 PROCEDURE — 80061 LIPID PANEL: CPT | Performed by: NURSE PRACTITIONER

## 2025-03-25 PROCEDURE — 82607 VITAMIN B-12: CPT | Performed by: NURSE PRACTITIONER

## 2025-03-25 PROCEDURE — 36415 COLL VENOUS BLD VENIPUNCTURE: CPT | Performed by: NURSE PRACTITIONER

## 2025-03-25 PROCEDURE — 80053 COMPREHEN METABOLIC PANEL: CPT | Performed by: NURSE PRACTITIONER

## 2025-03-25 RX ORDER — ACYCLOVIR 800 MG/1
1 TABLET ORAL
Qty: 2 EACH | Refills: 5 | Status: SHIPPED | OUTPATIENT
Start: 2025-03-25

## 2025-03-25 RX ORDER — DAPAGLIFLOZIN 10 MG/1
10 TABLET, FILM COATED ORAL DAILY
Qty: 30 TABLET | Refills: 1 | Status: SHIPPED | OUTPATIENT
Start: 2025-03-25 | End: 2025-03-31

## 2025-03-25 RX ORDER — DULAGLUTIDE 3 MG/.5ML
3 INJECTION, SOLUTION SUBCUTANEOUS WEEKLY
Qty: 2 ML | Refills: 1 | Status: SHIPPED | OUTPATIENT
Start: 2025-03-25

## 2025-03-25 RX ORDER — KETOROLAC TROMETHAMINE 30 MG/ML
1 INJECTION, SOLUTION INTRAMUSCULAR; INTRAVENOUS DAILY
Qty: 1 EACH | Refills: 0 | Status: SHIPPED | OUTPATIENT
Start: 2025-03-25

## 2025-03-25 NOTE — PROGRESS NOTES
Subjective   Chief Complaint   Patient presents with    Type 2 diabetes mellitus with hyperglycemia        Sunil Aparicio is a 44 y.o. male.    History of Present Illness  A1c is 9.2 today. He is taking Trulicity but stopped synjardy due to lack of insurance coverage. He is following diabetic diet. BP has been stable and at goal. Last LDL was 113 and he is taking a statin. Depression and anxiety are well controlled. Attention deficit symptoms are improved with strattera. He is sleeping well with seroquel.     I have reviewed the following portions of the patient's history and confirmed they are accurate: allergies, current medications, past family history, past medical history, past social history, past surgical history, and problem list    Review of Systems  Pertinent items are noted in HPI.     Current Outpatient Medications on File Prior to Visit   Medication Sig    atomoxetine (STRATTERA) 80 MG capsule TAKE 1 CAPSULE BY MOUTH DAILY    Blood Glucose Monitoring Suppl (ONE TOUCH ULTRA 2) w/Device kit     cyclobenzaprine (FLEXERIL) 10 MG tablet TAKE ONE TABLET BY MOUTH THREE TIMES A DAY AS NEEDED FOR MUSCLE SPASMS    glipizide (GLUCOTROL) 5 MG tablet TAKE 1 TABLET BY MOUTH TWO TIMES A DAY AS NEEDED WITH LARGE MEALS    lamoTRIgine (LaMICtal) 200 MG tablet TAKE 1 TABLET BY MOUTH DAILY    Lancets (ONETOUCH DELICA PLUS [id number]) misc     nebivolol (BYSTOLIC) 20 MG tablet TAKE 1 TABLET BY MOUTH DAILY    omeprazole (priLOSEC) 40 MG capsule TAKE 1 CAPSULE BY MOUTH DAILY    OneTouch Ultra test strip 1 each by Other route As Needed (diabetes).    QUEtiapine (SEROquel) 25 MG tablet TAKE 1 TO 2 TABLETS BY MOUTH 30 MINUTES BEFORE BEDTIME AS NEEDED FOR SLEEP    vilazodone (VIIBRYD) 10 MG tablet tablet Take 1 tablet by mouth Daily.    [DISCONTINUED] atorvastatin (LIPITOR) 40 MG tablet TAKE 1 TABLET BY MOUTH DAILY    [DISCONTINUED] atorvastatin (LIPITOR) 80 MG tablet Take 1 tablet by mouth Every Night.     No current  "facility-administered medications on file prior to visit.       Objective   Vitals:    03/25/25 0745   BP: 118/84   BP Location: Left arm   Patient Position: Sitting   Cuff Size: Large Adult   Pulse: 81   Temp: 98.7 °F (37.1 °C)   SpO2: 96%   Weight: 108 kg (237 lb 3.2 oz)   Height: 177.8 cm (70\")     Body mass index is 34.03 kg/m².    Physical Exam  Vitals reviewed.   Constitutional:       Appearance: Normal appearance. He is well-developed.   HENT:      Head: Normocephalic and atraumatic.      Nose: Nose normal.   Eyes:      General: Lids are normal.      Conjunctiva/sclera: Conjunctivae normal.      Pupils: Pupils are equal, round, and reactive to light.   Neck:      Thyroid: No thyromegaly.      Trachea: Trachea normal.   Cardiovascular:      Rate and Rhythm: Normal rate and regular rhythm.      Heart sounds: Normal heart sounds.   Pulmonary:      Effort: Pulmonary effort is normal. No respiratory distress.      Breath sounds: Normal breath sounds.   Skin:     General: Skin is warm and dry.   Neurological:      Mental Status: He is alert and oriented to person, place, and time.      GCS: GCS eye subscore is 4. GCS verbal subscore is 5. GCS motor subscore is 6.   Psychiatric:         Attention and Perception: Attention normal.         Mood and Affect: Mood normal.         Speech: Speech normal.         Behavior: Behavior normal. Behavior is cooperative.         Thought Content: Thought content normal.         Results      Lab Results   Component Value Date    WBC 8.21 03/25/2025    HGB 14.7 03/25/2025    HCT 45.7 03/25/2025    MCV 84.0 03/25/2025     03/25/2025      Lab Results   Component Value Date    GLUCOSE 167 (H) 03/25/2025    BUN 13 03/25/2025    CREATININE 1.03 03/25/2025     03/25/2025    K 4.4 03/25/2025     03/25/2025    CALCIUM 9.8 03/25/2025    PROTEINTOT 7.0 03/25/2025    ALBUMIN 4.5 03/25/2025    ALT 52 (H) 03/25/2025    AST 22 03/25/2025    ALKPHOS 94 03/25/2025    BILITOT 1.1 " 03/25/2025    GLOB 2.5 03/25/2025    AGRATIO 1.8 03/25/2025    BCR 12.6 03/25/2025    ANIONGAP 13.0 03/25/2025    EGFR 92.4 03/25/2025      Lab Results   Component Value Date    HGBA1C 9.2 (A) 03/25/2025      Lab Results   Component Value Date    CHOL 139 03/25/2025    CHLPL 161 06/05/2020    TRIG 125 03/25/2025    HDL 29 (L) 03/25/2025    LDL 87 03/25/2025      Lab Results   Component Value Date    TSH 1.520 03/25/2025          Assessment & Plan   Problem List Items Addressed This Visit       Attention deficit disorder (ADD) without hyperactivity    Hyperlipidemia    Relevant Medications    atorvastatin (LIPITOR) 80 MG tablet    Essential hypertension     Other Visit Diagnoses         Type 2 diabetes mellitus with hyperglycemia, without long-term current use of insulin    -  Primary    Relevant Medications    Dulaglutide (Trulicity) 3 MG/0.5ML solution auto-injector    Continuous Glucose  (FreeStyle Odalys 3 [place]) device    Continuous Glucose Sensor (FreeStyle Odalys 3 Sensor) misc    Other Relevant Orders    POC Glycosylated Hemoglobin (Hb A1C) (Completed)      Anxiety and depression          Screening for blood disease        Relevant Orders    CBC (No Diff) (Completed)    Comprehensive Metabolic Panel (Completed)    Lipid Panel (Completed)    TSH Rfx On Abnormal To Free T4 (Completed)    Vitamin B12 & Folate (Completed)    Vitamin D,25-Hydroxy (Completed)      Lipid screening        Relevant Orders    Lipid Panel (Completed)      Screening for diabetes mellitus          Thyroid disorder screening        Relevant Orders    TSH Rfx On Abnormal To Free T4 (Completed)      Encounter for vitamin deficiency screening        Relevant Orders    Vitamin B12 & Folate (Completed)    Vitamin D,25-Hydroxy (Completed)      Vitamin D deficiency        Relevant Orders    Vitamin D,25-Hydroxy (Completed)               Current Outpatient Medications:     atorvastatin (LIPITOR) 80 MG tablet, Take 1 tablet by mouth Every  Night., Disp: 90 tablet, Rfl: 1    atomoxetine (STRATTERA) 80 MG capsule, TAKE 1 CAPSULE BY MOUTH DAILY, Disp: 30 capsule, Rfl: 3    Blood Glucose Monitoring Suppl (ONE TOUCH ULTRA 2) w/Device kit, , Disp: , Rfl:     Continuous Glucose  (FreeStyle Odalys 3 [place]) device, Use 1 each Daily., Disp: 1 each, Rfl: 0    Continuous Glucose Sensor (FreeStyle Odalys 3 Sensor) misc, Use 1 each Every 14 (Fourteen) Days., Disp: 2 each, Rfl: 5    cyclobenzaprine (FLEXERIL) 10 MG tablet, TAKE ONE TABLET BY MOUTH THREE TIMES A DAY AS NEEDED FOR MUSCLE SPASMS, Disp: 90 tablet, Rfl: 1    Dulaglutide (Trulicity) 3 MG/0.5ML solution auto-injector, Inject 3 mg under the skin into the appropriate area as directed 1 (One) Time Per Week., Disp: 2 mL, Rfl: 1    glipizide (GLUCOTROL) 5 MG tablet, TAKE 1 TABLET BY MOUTH TWO TIMES A DAY AS NEEDED WITH LARGE MEALS, Disp: 180 tablet, Rfl: 2    lamoTRIgine (LaMICtal) 200 MG tablet, TAKE 1 TABLET BY MOUTH DAILY, Disp: 90 tablet, Rfl: 3    Lancets (ONETOUCH DELICA PLUS [id number]) misc, , Disp: , Rfl:     metFORMIN ER (GLUCOPHAGE-XR) 500 MG 24 hr tablet, Take 1 tablet by mouth 2 (Two) Times a Day Before Meals., Disp: 60 tablet, Rfl: 1    nebivolol (BYSTOLIC) 20 MG tablet, TAKE 1 TABLET BY MOUTH DAILY, Disp: 90 tablet, Rfl: 2    omeprazole (priLOSEC) 40 MG capsule, TAKE 1 CAPSULE BY MOUTH DAILY, Disp: 90 capsule, Rfl: 3    OneTouch Ultra test strip, 1 each by Other route As Needed (diabetes)., Disp: 100 each, Rfl: 5    QUEtiapine (SEROquel) 25 MG tablet, TAKE 1 TO 2 TABLETS BY MOUTH 30 MINUTES BEFORE BEDTIME AS NEEDED FOR SLEEP, Disp: 45 tablet, Rfl: 1    vilazodone (VIIBRYD) 10 MG tablet tablet, Take 1 tablet by mouth Daily., Disp: 30 tablet, Rfl: 1    Assessment & Plan  Increase trulicity to 3mg weekly. Continue glipizide with meals. Insurance will not cover jardiance or farxiga. Patient is willing to retry metformin for better glucose control. Follow diabetic diet. Continue bystolic for  hypertension. Continue lipitor for hyperlipidemia. Continue viibryd, lamictal, seroquel, and strattera for anxiety, depression, insomnia, and attention deficit.          Plan of care reviewed with the patient at the conclusion of today's visit.  Education was provided regarding diagnosis, management, and any prescribed or recommended OTC medications.  Patient verbalized understanding of and agreement with management plan.     Return in 3 months (on 6/25/2025), or if symptoms worsen or fail to improve.      Transcribed from ambient dictation for MIRANDA Machuca by MIRANDA Machuca.  03/25/25   08:13 EDT    Patient or patient representative verbalized consent for the use of Ambient Listening during the visit with  MIRANDA Machuca for chart documentation. 3/31/2025  08:40 EDT

## 2025-03-27 DIAGNOSIS — E11.65 TYPE 2 DIABETES MELLITUS WITH HYPERGLYCEMIA, WITHOUT LONG-TERM CURRENT USE OF INSULIN: Primary | ICD-10-CM

## 2025-03-27 RX ORDER — METFORMIN HYDROCHLORIDE 500 MG/1
500 TABLET, EXTENDED RELEASE ORAL
Qty: 60 TABLET | Refills: 1 | Status: SHIPPED | OUTPATIENT
Start: 2025-03-27

## 2025-03-31 RX ORDER — ATORVASTATIN CALCIUM 80 MG/1
80 TABLET, FILM COATED ORAL NIGHTLY
Qty: 90 TABLET | Refills: 1 | Status: SHIPPED | OUTPATIENT
Start: 2025-03-31

## 2025-04-22 DIAGNOSIS — E11.65 TYPE 2 DIABETES MELLITUS WITH HYPERGLYCEMIA, WITHOUT LONG-TERM CURRENT USE OF INSULIN: ICD-10-CM

## 2025-04-22 DIAGNOSIS — F51.01 PRIMARY INSOMNIA: ICD-10-CM

## 2025-04-22 RX ORDER — QUETIAPINE FUMARATE 25 MG/1
TABLET, FILM COATED ORAL
Qty: 45 TABLET | Refills: 3 | Status: SHIPPED | OUTPATIENT
Start: 2025-04-22

## 2025-04-22 RX ORDER — DULAGLUTIDE 3 MG/.5ML
3 INJECTION, SOLUTION SUBCUTANEOUS WEEKLY
Qty: 2 ML | Refills: 3 | Status: SHIPPED | OUTPATIENT
Start: 2025-04-22 | End: 2025-04-25

## 2025-05-01 DIAGNOSIS — E11.65 TYPE 2 DIABETES MELLITUS WITH HYPERGLYCEMIA, WITHOUT LONG-TERM CURRENT USE OF INSULIN: Primary | ICD-10-CM

## 2025-05-01 RX ORDER — SEMAGLUTIDE 1.34 MG/ML
0.25 INJECTION, SOLUTION SUBCUTANEOUS WEEKLY
Qty: 3 ML | Refills: 1 | Status: SHIPPED | OUTPATIENT
Start: 2025-05-01

## 2025-05-02 RX ORDER — ATOMOXETINE 80 MG/1
80 CAPSULE ORAL DAILY
Qty: 30 CAPSULE | Refills: 3 | Status: SHIPPED | OUTPATIENT
Start: 2025-05-02

## 2025-05-16 DIAGNOSIS — E11.65 TYPE 2 DIABETES MELLITUS WITH HYPERGLYCEMIA, WITHOUT LONG-TERM CURRENT USE OF INSULIN: ICD-10-CM

## 2025-05-16 RX ORDER — METFORMIN HYDROCHLORIDE 500 MG/1
TABLET, EXTENDED RELEASE ORAL
Qty: 60 TABLET | Refills: 3 | Status: SHIPPED | OUTPATIENT
Start: 2025-05-16

## 2025-06-27 ENCOUNTER — OFFICE VISIT (OUTPATIENT)
Dept: INTERNAL MEDICINE | Age: 45
End: 2025-06-27
Payer: COMMERCIAL

## 2025-06-27 VITALS
BODY MASS INDEX: 33.84 KG/M2 | DIASTOLIC BLOOD PRESSURE: 84 MMHG | SYSTOLIC BLOOD PRESSURE: 136 MMHG | TEMPERATURE: 97.1 F | HEART RATE: 90 BPM | WEIGHT: 236.4 LBS | HEIGHT: 70 IN | OXYGEN SATURATION: 97 %

## 2025-06-27 DIAGNOSIS — E78.2 MIXED HYPERLIPIDEMIA: ICD-10-CM

## 2025-06-27 DIAGNOSIS — I10 ESSENTIAL HYPERTENSION: ICD-10-CM

## 2025-06-27 DIAGNOSIS — E11.65 TYPE 2 DIABETES MELLITUS WITH HYPERGLYCEMIA, WITHOUT LONG-TERM CURRENT USE OF INSULIN: Primary | ICD-10-CM

## 2025-06-27 LAB
EXPIRATION DATE: ABNORMAL
HBA1C MFR BLD: 8.3 % (ref 4.5–5.7)
Lab: ABNORMAL

## 2025-06-27 RX ORDER — DULAGLUTIDE 3 MG/.5ML
3 INJECTION, SOLUTION SUBCUTANEOUS WEEKLY
Start: 2025-06-27

## 2025-06-27 NOTE — PROGRESS NOTES
Subjective   Chief Complaint   Patient presents with    Type 2 diabetes mellitus with hyperglycemia        Sunil Aparicio is a 44 y.o. male.    History of Present Illness  The patient presents for a follow-up visit.    He is currently not on Ozempic or glipizide but is taking Trulicity 3 mg. He has requested an updated list of his current medications. His medication regimen includes lamotrigine, blood pressure medication, metformin, Strattera 80 mg, vitamin B supplement, omeprazole, and atorvastatin 80 mg. He has reduced his sleep medication dosage due to excessive fatigue experienced a few weeks ago. He is uncertain about his use of Flexeril.    I have reviewed the following portions of the patient's history and confirmed they are accurate: allergies, current medications, past family history, past medical history, past social history, past surgical history, and problem list    Review of Systems  Pertinent items are noted in HPI.     Current Outpatient Medications on File Prior to Visit   Medication Sig    atomoxetine (STRATTERA) 80 MG capsule TAKE 1 CAPSULE BY MOUTH DAILY    atorvastatin (LIPITOR) 80 MG tablet Take 1 tablet by mouth Every Night.    Blood Glucose Monitoring Suppl (ONE TOUCH ULTRA 2) w/Device kit     Continuous Glucose  (FreeStyle Odalys 3 [place]) device Use 1 each Daily.    Continuous Glucose Sensor (FreeStyle Odalys 3 Sensor) [facility] [place] 1 each Every 14 (Fourteen) Days.    lamoTRIgine (LaMICtal) 200 MG tablet TAKE 1 TABLET BY MOUTH DAILY    Lancets (ONETOUCH DELICA PLUS [id number]) misc     metFORMIN ER (GLUCOPHAGE-XR) 500 MG 24 hr tablet TAKE 1 TABLET BY MOUTH 2 TIMES A DAY BEFORE A MEAL    nebivolol (BYSTOLIC) 20 MG tablet TAKE 1 TABLET BY MOUTH DAILY    omeprazole (priLOSEC) 40 MG capsule TAKE 1 CAPSULE BY MOUTH DAILY    OneTouch Ultra test strip 1 each by Other route As Needed (diabetes).    QUEtiapine (SEROquel) 25 MG tablet TAKE 1 TO 2 TABLETS BY MOUTH 30 MINUTES BEFORE BEDTIME AS NEEDED FOR  "SLEEP     No current facility-administered medications on file prior to visit.       Objective   Vitals:    06/27/25 0803   BP: 136/84   BP Location: Left arm   Patient Position: Sitting   Cuff Size: Large Adult   Pulse: 90   Temp: 97.1 °F (36.2 °C)   SpO2: 97%   Weight: 107 kg (236 lb 6.4 oz)   Height: 177.8 cm (70\")     Body mass index is 33.92 kg/m².    Physical Exam  Vitals reviewed.   Constitutional:       Appearance: Normal appearance. He is well-developed.   HENT:      Head: Normocephalic and atraumatic.      Nose: Nose normal.   Eyes:      General: Lids are normal.      Conjunctiva/sclera: Conjunctivae normal.      Pupils: Pupils are equal, round, and reactive to light.   Neck:      Thyroid: No thyromegaly.      Trachea: Trachea normal.   Cardiovascular:      Rate and Rhythm: Normal rate and regular rhythm.      Heart sounds: Normal heart sounds.   Pulmonary:      Effort: Pulmonary effort is normal. No respiratory distress.      Breath sounds: Normal breath sounds.   Skin:     General: Skin is warm and dry.   Neurological:      Mental Status: He is alert and oriented to person, place, and time.      GCS: GCS eye subscore is 4. GCS verbal subscore is 5. GCS motor subscore is 6.   Psychiatric:         Attention and Perception: Attention normal.         Mood and Affect: Mood normal.         Speech: Speech normal.         Behavior: Behavior normal. Behavior is cooperative.         Thought Content: Thought content normal.         Results  Labs   - : 5   - Neutrophils: 500   - A1c: 8.3    Lab Results   Component Value Date    WBC 8.21 03/25/2025    HGB 14.7 03/25/2025    HCT 45.7 03/25/2025    MCV 84.0 03/25/2025     03/25/2025      Lab Results   Component Value Date    GLUCOSE 167 (H) 03/25/2025    BUN 13 03/25/2025    CREATININE 1.03 03/25/2025     03/25/2025    K 4.4 03/25/2025     03/25/2025    CALCIUM 9.8 03/25/2025    PROTEINTOT 7.0 03/25/2025    ALBUMIN 4.5 03/25/2025    ALT 52 (H) " 03/25/2025    AST 22 03/25/2025    ALKPHOS 94 03/25/2025    BILITOT 1.1 03/25/2025    GLOB 2.5 03/25/2025    AGRATIO 1.8 03/25/2025    BCR 12.6 03/25/2025    ANIONGAP 13.0 03/25/2025    EGFR 92.4 03/25/2025      Lab Results   Component Value Date    HGBA1C 8.3 (A) 06/27/2025      Lab Results   Component Value Date    CHOL 139 03/25/2025    CHLPL 161 06/05/2020    TRIG 125 03/25/2025    HDL 29 (L) 03/25/2025    LDL 87 03/25/2025      Lab Results   Component Value Date    TSH 1.520 03/25/2025          Assessment & Plan   Problem List Items Addressed This Visit       Hyperlipidemia    Essential hypertension     Other Visit Diagnoses         Type 2 diabetes mellitus with hyperglycemia, without long-term current use of insulin    -  Primary    Relevant Medications    Dulaglutide (Trulicity) 3 MG/0.5ML solution auto-injector    Other Relevant Orders    POC Glycosylated Hemoglobin (Hb A1C) (Completed)               Current Outpatient Medications:     atomoxetine (STRATTERA) 80 MG capsule, TAKE 1 CAPSULE BY MOUTH DAILY, Disp: 30 capsule, Rfl: 3    atorvastatin (LIPITOR) 80 MG tablet, Take 1 tablet by mouth Every Night., Disp: 90 tablet, Rfl: 1    Blood Glucose Monitoring Suppl (ONE TOUCH ULTRA 2) w/Device kit, , Disp: , Rfl:     Continuous Glucose  (FreeStyle Odalys 3 [place]) device, Use 1 each Daily., Disp: 1 each, Rfl: 0    Continuous Glucose Sensor (FreeStyle Odalys 3 Sensor) misc, Use 1 each Every 14 (Fourteen) Days., Disp: 2 each, Rfl: 5    lamoTRIgine (LaMICtal) 200 MG tablet, TAKE 1 TABLET BY MOUTH DAILY, Disp: 90 tablet, Rfl: 3    Lancets (ONETOUCH DELICA PLUS [id number]) misc, , Disp: , Rfl:     metFORMIN ER (GLUCOPHAGE-XR) 500 MG 24 hr tablet, TAKE 1 TABLET BY MOUTH 2 TIMES A DAY BEFORE A MEAL, Disp: 60 tablet, Rfl: 3    nebivolol (BYSTOLIC) 20 MG tablet, TAKE 1 TABLET BY MOUTH DAILY, Disp: 90 tablet, Rfl: 2    omeprazole (priLOSEC) 40 MG capsule, TAKE 1 CAPSULE BY MOUTH DAILY, Disp: 90 capsule, Rfl: 3     OneTouch Ultra test strip, 1 each by Other route As Needed (diabetes)., Disp: 100 each, Rfl: 5    QUEtiapine (SEROquel) 25 MG tablet, TAKE 1 TO 2 TABLETS BY MOUTH 30 MINUTES BEFORE BEDTIME AS NEEDED FOR SLEEP, Disp: 45 tablet, Rfl: 3    Dulaglutide (Trulicity) 3 MG/0.5ML solution auto-injector, Inject 3 mg under the skin into the appropriate area as directed 1 (One) Time Per Week., Disp: , Rfl:     Assessment & Plan  1. Diabetes Mellitus.  - A1c levels have decreased from 10.3 to 8.3, with the target being <7.  - Current medications include metformin and Trulicity 3 mg; prescription for Trulicity 3 mg will be provided but not sent to the pharmacy.  - Advised to maintain a balanced diet and regular exercise routine to manage blood sugar levels.  - Monitoring of A1c levels will continue, with a potential check if not done recently.    2. Medication Management.  - Updated list of medications requested; currently taking lamotrigine, blood pressure medication, metformin, Strattera 80 mg, vitamin B supplement, omeprazole, and atorvastatin 80 mg.  - Reduced sleeping medication to one tablet at night.  - Advised to continue current medications and check with the  for a printed list of medications.  - Viibryd 10 mg, previously prescribed in 12/2024, is not being taken and will be removed from the medication list.         Plan of care reviewed with the patient at the conclusion of today's visit.  Education was provided regarding diagnosis, management, and any prescribed or recommended OTC medications.  Patient verbalized understanding of and agreement with management plan.     Return in about 3 months (around 9/27/2025), or if symptoms worsen or fail to improve.      Transcribed from ambient dictation for MIRANDA Machuca by MIRANDA Machuca.  06/27/25   08:47 EDT    Patient or patient representative verbalized consent for the use of Ambient Listening during the visit with  Lilibeth Tabor  MIRANDA Lopez for chart documentation. 7/6/2025  21:11 EDT

## 2025-08-04 DIAGNOSIS — F51.01 PRIMARY INSOMNIA: ICD-10-CM

## 2025-08-04 RX ORDER — QUETIAPINE FUMARATE 25 MG/1
TABLET, FILM COATED ORAL
Qty: 45 TABLET | Refills: 3 | Status: SHIPPED | OUTPATIENT
Start: 2025-08-04